# Patient Record
Sex: FEMALE | Race: WHITE | URBAN - METROPOLITAN AREA
[De-identification: names, ages, dates, MRNs, and addresses within clinical notes are randomized per-mention and may not be internally consistent; named-entity substitution may affect disease eponyms.]

---

## 2023-01-01 ENCOUNTER — NURSING HOME VISIT (OUTPATIENT)
Dept: POST ACUTE CARE | Facility: EXTERNAL LOCATION | Age: 88
End: 2023-01-01
Payer: MEDICARE

## 2023-01-01 DIAGNOSIS — I50.32 CHRONIC DIASTOLIC CONGESTIVE HEART FAILURE (MULTI): ICD-10-CM

## 2023-01-01 DIAGNOSIS — I10 HYPERTENSION, ESSENTIAL: ICD-10-CM

## 2023-01-01 DIAGNOSIS — G30.1 SEVERE LATE ONSET ALZHEIMER'S DEMENTIA WITHOUT BEHAVIORAL DISTURBANCE, PSYCHOTIC DISTURBANCE, MOOD DISTURBANCE, OR ANXIETY (MULTI): ICD-10-CM

## 2023-01-01 DIAGNOSIS — I50.9 CONGESTIVE HEART FAILURE, UNSPECIFIED HF CHRONICITY, UNSPECIFIED HEART FAILURE TYPE (MULTI): ICD-10-CM

## 2023-01-01 DIAGNOSIS — M25.511 ACUTE PAIN OF BOTH SHOULDERS: ICD-10-CM

## 2023-01-01 DIAGNOSIS — F02.C0 SEVERE LATE ONSET ALZHEIMER'S DEMENTIA WITHOUT BEHAVIORAL DISTURBANCE, PSYCHOTIC DISTURBANCE, MOOD DISTURBANCE, OR ANXIETY (MULTI): ICD-10-CM

## 2023-01-01 DIAGNOSIS — R40.0 DAYTIME SLEEPINESS: ICD-10-CM

## 2023-01-01 DIAGNOSIS — U07.1 COVID: Primary | ICD-10-CM

## 2023-01-01 DIAGNOSIS — F02.C4 SEVERE LATE ONSET ALZHEIMER'S DEMENTIA WITH ANXIETY (MULTI): Primary | ICD-10-CM

## 2023-01-01 DIAGNOSIS — R63.5 WEIGHT GAIN WITH EDEMA: ICD-10-CM

## 2023-01-01 DIAGNOSIS — R62.7 FTT (FAILURE TO THRIVE) IN ADULT: ICD-10-CM

## 2023-01-01 DIAGNOSIS — F02.C0 SEVERE LATE ONSET ALZHEIMER'S DEMENTIA WITHOUT BEHAVIORAL DISTURBANCE, PSYCHOTIC DISTURBANCE, MOOD DISTURBANCE, OR ANXIETY (MULTI): Primary | ICD-10-CM

## 2023-01-01 DIAGNOSIS — I50.33 ACUTE ON CHRONIC DIASTOLIC CONGESTIVE HEART FAILURE (MULTI): Primary | ICD-10-CM

## 2023-01-01 DIAGNOSIS — R63.0 POOR APPETITE: ICD-10-CM

## 2023-01-01 DIAGNOSIS — F02.C4 SEVERE LATE ONSET ALZHEIMER'S DEMENTIA WITH ANXIETY (MULTI): ICD-10-CM

## 2023-01-01 DIAGNOSIS — G30.1 SEVERE LATE ONSET ALZHEIMER'S DEMENTIA WITHOUT BEHAVIORAL DISTURBANCE, PSYCHOTIC DISTURBANCE, MOOD DISTURBANCE, OR ANXIETY (MULTI): Primary | ICD-10-CM

## 2023-01-01 DIAGNOSIS — R60.9 WEIGHT GAIN WITH EDEMA: ICD-10-CM

## 2023-01-01 DIAGNOSIS — R53.1 WEAKNESS: Primary | ICD-10-CM

## 2023-01-01 DIAGNOSIS — G30.1 SEVERE LATE ONSET ALZHEIMER'S DEMENTIA WITH ANXIETY (MULTI): Primary | ICD-10-CM

## 2023-01-01 DIAGNOSIS — U07.1 COVID: ICD-10-CM

## 2023-01-01 DIAGNOSIS — R60.9 EDEMA, UNSPECIFIED TYPE: Primary | ICD-10-CM

## 2023-01-01 DIAGNOSIS — R79.1 SUBTHERAPEUTIC INTERNATIONAL NORMALIZED RATIO (INR): ICD-10-CM

## 2023-01-01 DIAGNOSIS — R53.1 WEAKNESS: ICD-10-CM

## 2023-01-01 DIAGNOSIS — I11.0 HYPERTENSIVE HEART DISEASE WITH CHRONIC DIASTOLIC CONGESTIVE HEART FAILURE (MULTI): ICD-10-CM

## 2023-01-01 DIAGNOSIS — I50.32 CHRONIC DIASTOLIC CONGESTIVE HEART FAILURE (MULTI): Primary | ICD-10-CM

## 2023-01-01 DIAGNOSIS — F41.8 DEPRESSION WITH ANXIETY: ICD-10-CM

## 2023-01-01 DIAGNOSIS — F41.8 DEPRESSION WITH ANXIETY: Primary | ICD-10-CM

## 2023-01-01 DIAGNOSIS — R23.4 SCAB: ICD-10-CM

## 2023-01-01 DIAGNOSIS — I10 HYPERTENSION, UNSPECIFIED TYPE: ICD-10-CM

## 2023-01-01 DIAGNOSIS — F32.A DEPRESSION, UNSPECIFIED DEPRESSION TYPE: ICD-10-CM

## 2023-01-01 DIAGNOSIS — R63.5 WEIGHT GAIN WITH EDEMA: Primary | ICD-10-CM

## 2023-01-01 DIAGNOSIS — R20.9 COLD FEET: Primary | ICD-10-CM

## 2023-01-01 DIAGNOSIS — R60.9 WEIGHT GAIN WITH EDEMA: Primary | ICD-10-CM

## 2023-01-01 DIAGNOSIS — L98.9 SKIN ABNORMALITY: Primary | ICD-10-CM

## 2023-01-01 DIAGNOSIS — R63.5 WEIGHT GAIN: ICD-10-CM

## 2023-01-01 DIAGNOSIS — G30.1 SEVERE LATE ONSET ALZHEIMER'S DEMENTIA WITH ANXIETY (MULTI): ICD-10-CM

## 2023-01-01 DIAGNOSIS — I50.32 HYPERTENSIVE HEART DISEASE WITH CHRONIC DIASTOLIC CONGESTIVE HEART FAILURE (MULTI): ICD-10-CM

## 2023-01-01 DIAGNOSIS — M25.512 ACUTE PAIN OF BOTH SHOULDERS: ICD-10-CM

## 2023-01-01 DIAGNOSIS — I11.0 HYPERTENSIVE HEART DISEASE WITH CHRONIC DIASTOLIC CONGESTIVE HEART FAILURE (MULTI): Primary | ICD-10-CM

## 2023-01-01 DIAGNOSIS — I50.33 ACUTE ON CHRONIC DIASTOLIC CONGESTIVE HEART FAILURE (MULTI): ICD-10-CM

## 2023-01-01 DIAGNOSIS — I50.32 HYPERTENSIVE HEART DISEASE WITH CHRONIC DIASTOLIC CONGESTIVE HEART FAILURE (MULTI): Primary | ICD-10-CM

## 2023-01-01 DIAGNOSIS — I10 HYPERTENSION, ESSENTIAL: Primary | ICD-10-CM

## 2023-01-01 DIAGNOSIS — L85.3 DRY SKIN: ICD-10-CM

## 2023-01-01 DIAGNOSIS — F03.918 DEMENTIA WITH OTHER BEHAVIORAL DISTURBANCE, UNSPECIFIED DEMENTIA SEVERITY, UNSPECIFIED DEMENTIA TYPE (MULTI): ICD-10-CM

## 2023-01-01 PROCEDURE — 99308 SBSQ NF CARE LOW MDM 20: CPT | Performed by: INTERNAL MEDICINE

## 2023-01-01 PROCEDURE — 99309 SBSQ NF CARE MODERATE MDM 30: CPT | Performed by: INTERNAL MEDICINE

## 2023-01-01 PROCEDURE — 99308 SBSQ NF CARE LOW MDM 20: CPT | Performed by: NURSE PRACTITIONER

## 2023-01-01 PROCEDURE — 99307 SBSQ NF CARE SF MDM 10: CPT | Performed by: NURSE PRACTITIONER

## 2023-01-01 PROCEDURE — 99309 SBSQ NF CARE MODERATE MDM 30: CPT | Performed by: NURSE PRACTITIONER

## 2023-03-08 NOTE — LETTER
Subjective  Chief complaint: Juani Sinclair is a 88 y.o. female who is a long term resident patient being seen and evaluated for multiple medical problems.  Patient presents for Thank you general medical care and follow-up    HPI:  patient presents for general medical care and f/u.  Patient seen and examined at bedside.  No issues per nursing.  Patient has no acute complaints.  Patient with diagnosis of depression.   Denies feeling down and thoughts of harming self or others.   Denies chest pain and headache.  denies sob, orthopnea, weight gain.  No acute distress        Review of Systems  All systems reviewed and negative except for what was mentioned in the HPI    Vital signs:     Objective  Physical Exam  Constitutional:       General: She is not in acute distress.  Eyes:      Extraocular Movements: Extraocular movements intact.   Cardiovascular:      Rate and Rhythm: Regular rhythm.   Pulmonary:      Effort: Pulmonary effort is normal.      Breath sounds: Normal breath sounds.   Abdominal:      General: Bowel sounds are normal.      Palpations: Abdomen is soft.   Musculoskeletal:      Cervical back: Neck supple.      Right lower leg: No edema.      Left lower leg: No edema.   Neurological:      Mental Status: She is alert.   Psychiatric:         Mood and Affect: Mood normal.         Behavior: Behavior is cooperative.         Assessment/Plan  Problem List Items Addressed This Visit          Circulatory    HTN (hypertension)     At goal  Continue antihypertensives         CHF (congestive heart failure) (CMS/[facility])     Stab;e  Continue Diuretics            Other    Depression     Mood stable  Escitalopram          Medications, treatments, and labs reviewed  Continue medications and treatments as listed in [facility]    Scribe Attestation  By signing my name below, Rocio SMALLS, Scribe   attest that this documentation has been prepared under the direction and in the presence of Hong Hope MD.    Provider  Attestation - Scribe documentation  All medical record entries made by the Scribe were at my direction and personally dictated by me. I have reviewed the chart and agree that the record accurately reflects my personal performance of the history, physical exam, discussion and plan.

## 2023-03-09 PROBLEM — I50.9 CHF (CONGESTIVE HEART FAILURE) (MULTI): Status: ACTIVE | Noted: 2023-01-01

## 2023-03-09 PROBLEM — F32.A DEPRESSION: Status: ACTIVE | Noted: 2023-01-01

## 2023-03-09 PROBLEM — I10 HTN (HYPERTENSION): Status: ACTIVE | Noted: 2023-01-01

## 2023-03-10 NOTE — PROGRESS NOTES
Subjective   Chief complaint: Juani Sinclair is a 88 y.o. female who is a long term resident patient being seen and evaluated for multiple medical problems.  Patient presents for Thank you general medical care and follow-up    HPI:  patient presents for general medical care and f/u.  Patient seen and examined at bedside.  No issues per nursing.  Patient has no acute complaints.  Patient with diagnosis of depression.   Denies feeling down and thoughts of harming self or others.   Denies chest pain and headache.  denies sob, orthopnea, weight gain.  No acute distress        Review of Systems  All systems reviewed and negative except for what was mentioned in the HPI    Vital signs:     Objective   Physical Exam  Constitutional:       General: She is not in acute distress.  Eyes:      Extraocular Movements: Extraocular movements intact.   Cardiovascular:      Rate and Rhythm: Regular rhythm.   Pulmonary:      Effort: Pulmonary effort is normal.      Breath sounds: Normal breath sounds.   Abdominal:      General: Bowel sounds are normal.      Palpations: Abdomen is soft.   Musculoskeletal:      Cervical back: Neck supple.      Right lower leg: No edema.      Left lower leg: No edema.   Neurological:      Mental Status: She is alert.   Psychiatric:         Mood and Affect: Mood normal.         Behavior: Behavior is cooperative.         Assessment/Plan   Problem List Items Addressed This Visit          Circulatory    HTN (hypertension)     At goal  Continue antihypertensives         CHF (congestive heart failure) (CMS/[facility])     Stab;e  Continue Diuretics            Other    Depression     Mood stable  Escitalopram          Medications, treatments, and labs reviewed  Continue medications and treatments as listed in P[facility]    Scribe Attestation  By signing my name below, Rocio SMALLS, Scribe   attest that this documentation has been prepared under the direction and in the presence of Hong Hope MD.    Provider  Attestation - Scribe documentation  All medical record entries made by the Scribe were at my direction and personally dictated by me. I have reviewed the chart and agree that the record accurately reflects my personal performance of the history, physical exam, discussion and plan.

## 2023-04-12 NOTE — LETTER
Patient: Juani Sinclair  : 1934    Encounter Date: 2023    PROGRESS NOTE    Subjective  Chief complaint: Juani Sinclair is a 88 y.o. female who is a long term care patient being seen and evaluated for monthly general medical care and follow-up.    HPI:   patient presents for general medical care and f/u.  Patient seen and examined at bedside.  No issues per nursing.  Patient has no acute complaints.   denies sob, orthopnea, weight gain.   Denies chest pain and headache.    Patient with diagnosis of depression.  Denies feeling down and thoughts of harming self or others.  No acute distress.  Patient has dementia and requires assist with ADLs.        Objective  Vital signs:  108/62, 88, 18, 93%    Physical Exam  Constitutional:       General: She is not in acute distress.  Eyes:      Extraocular Movements: Extraocular movements intact.   Cardiovascular:      Rate and Rhythm: Normal rate and regular rhythm.   Pulmonary:      Effort: Pulmonary effort is normal.      Breath sounds: Normal breath sounds.   Abdominal:      General: Bowel sounds are normal.      Palpations: Abdomen is soft.   Musculoskeletal:         General: Normal range of motion.      Cervical back: Normal range of motion and neck supple.      Right lower leg: No edema.      Left lower leg: No edema.   Neurological:      Mental Status: She is alert.   Psychiatric:         Mood and Affect: Mood normal.         Behavior: Behavior is cooperative.         Assessment/Plan  Problem List Items Addressed This Visit          Nervous    Dementia (CMS/[facility])     Secured unit for safety            Circulatory    HTN (hypertension)     At goal  Continue antihypertensives         CHF (congestive heart failure) (CMS/[facility])     Stable  Continue Diuretics            Other    Depression     Mood stable  Escitalopram          Medications, treatments, and labs reviewed  Continue medications and treatments as listed in [facility]    Scribe Attestation  By signing my  name below, I, Shantanu Morenoibe   attest that this documentation has been prepared under the direction and in the presence of Hong Hope MD.    Provider Attestation - Scribe documentation  All medical record entries made by the Scribe were at my direction and personally dictated by me. I have reviewed the chart and agree that the record accurately reflects my personal performance of the history, physical exam, discussion and plan.      Electronically Signed By: Hong Hope MD   4/14/23  5:54 PM

## 2023-04-14 PROBLEM — F03.90 DEMENTIA (MULTI): Status: ACTIVE | Noted: 2023-01-01

## 2023-04-14 NOTE — PROGRESS NOTES
PROGRESS NOTE    Subjective   Chief complaint: Juani Sinclair is a 88 y.o. female who is a long term care patient being seen and evaluated for monthly general medical care and follow-up.    HPI:   patient presents for general medical care and f/u.  Patient seen and examined at bedside.  No issues per nursing.  Patient has no acute complaints.   denies sob, orthopnea, weight gain.   Denies chest pain and headache.    Patient with diagnosis of depression.  Denies feeling down and thoughts of harming self or others.  No acute distress.  Patient has dementia and requires assist with ADLs.        Objective   Vital signs:  108/62, 88, 18, 93%    Physical Exam  Constitutional:       General: She is not in acute distress.  Eyes:      Extraocular Movements: Extraocular movements intact.   Cardiovascular:      Rate and Rhythm: Normal rate and regular rhythm.   Pulmonary:      Effort: Pulmonary effort is normal.      Breath sounds: Normal breath sounds.   Abdominal:      General: Bowel sounds are normal.      Palpations: Abdomen is soft.   Musculoskeletal:         General: Normal range of motion.      Cervical back: Normal range of motion and neck supple.      Right lower leg: No edema.      Left lower leg: No edema.   Neurological:      Mental Status: She is alert.   Psychiatric:         Mood and Affect: Mood normal.         Behavior: Behavior is cooperative.         Assessment/Plan   Problem List Items Addressed This Visit          Nervous    Dementia (CMS/[facility])     Secured unit for safety            Circulatory    HTN (hypertension)     At goal  Continue antihypertensives         CHF (congestive heart failure) (CMS/[facility])     Stable  Continue Diuretics            Other    Depression     Mood stable  Escitalopram          Medications, treatments, and labs reviewed  Continue medications and treatments as listed in [facility]    Scribe Attestation  By signing my name below, IRocio, Scribe   attest that this documentation  has been prepared under the direction and in the presence of Hong Hope MD.    Provider Attestation - Scribe documentation  All medical record entries made by the Scribe were at my direction and personally dictated by me. I have reviewed the chart and agree that the record accurately reflects my personal performance of the history, physical exam, discussion and plan.

## 2023-04-19 NOTE — LETTER
Patient: Juani Sinclair  : 1934    Encounter Date: 2023    PROGRESS NOTE    Subjective  Chief complaint: Juani Sinclair is a 88 y.o. female who is a long term care patient being seen and evaluated for  weakness    HPI:   patient with a weakness is being evaluated for possible treatment with therapy services.  She has no new issues or concerns at this time.  No acute distress.   Denies shortness of breath orthopnea.      Objective  Vital signs:  126/74, 94%    Physical Exam  Constitutional:       General: She is not in acute distress.  Eyes:      Extraocular Movements: Extraocular movements intact.   Cardiovascular:      Rate and Rhythm: Normal rate and regular rhythm.   Pulmonary:      Effort: Pulmonary effort is normal.      Breath sounds: Normal breath sounds.   Abdominal:      General: Bowel sounds are normal.      Palpations: Abdomen is soft.   Musculoskeletal:         General: Normal range of motion.      Cervical back: Normal range of motion and neck supple.      Right lower leg: No edema.      Left lower leg: No edema.   Neurological:      Mental Status: She is alert.   Psychiatric:         Mood and Affect: Mood normal.         Behavior: Behavior is cooperative.         Assessment/Plan  Problem List Items Addressed This Visit          Circulatory    CHF (congestive heart failure) (CMS/HCC)     Stable  Continue Diuretics            Other    Weakness      therapy to eval and treat.          Medications, treatments, and labs reviewed  Continue medications and treatments as listed in [facility]    Scribe Attestation  By signing my name below, I, Mihir Moreno   attest that this documentation has been prepared under the direction and in the presence of Hong Hope MD.    Provider Attestation - Scribe documentation  All medical record entries made by the Scribe were at my direction and personally dictated by me. I have reviewed the chart and agree that the record accurately reflects my  personal performance of the history, physical exam, discussion and plan.    1. Congestive heart failure, unspecified HF chronicity, unspecified heart failure type (CMS/HCC)        2. Weakness               Electronically Signed By: Hong Hope MD   4/20/23  6:25 PM

## 2023-04-20 PROBLEM — R53.1 WEAKNESS: Status: ACTIVE | Noted: 2023-01-01

## 2023-04-20 NOTE — PROGRESS NOTES
PROGRESS NOTE    Subjective   Chief complaint: Juani Sinclair is a 88 y.o. female who is a long term care patient being seen and evaluated for  weakness    HPI:   patient with a weakness is being evaluated for possible treatment with therapy services.  She has no new issues or concerns at this time.  No acute distress.   Denies shortness of breath orthopnea.      Objective   Vital signs:  126/74, 94%    Physical Exam  Constitutional:       General: She is not in acute distress.  Eyes:      Extraocular Movements: Extraocular movements intact.   Cardiovascular:      Rate and Rhythm: Normal rate and regular rhythm.   Pulmonary:      Effort: Pulmonary effort is normal.      Breath sounds: Normal breath sounds.   Abdominal:      General: Bowel sounds are normal.      Palpations: Abdomen is soft.   Musculoskeletal:         General: Normal range of motion.      Cervical back: Normal range of motion and neck supple.      Right lower leg: No edema.      Left lower leg: No edema.   Neurological:      Mental Status: She is alert.   Psychiatric:         Mood and Affect: Mood normal.         Behavior: Behavior is cooperative.         Assessment/Plan   Problem List Items Addressed This Visit          Circulatory    CHF (congestive heart failure) (CMS/HCC)     Stable  Continue Diuretics            Other    Weakness      therapy to eval and treat.          Medications, treatments, and labs reviewed  Continue medications and treatments as listed in [facility]    Scribe Attestation  By signing my name below, I, Mihir Moreno   attest that this documentation has been prepared under the direction and in the presence of Hong Hope MD.    Provider Attestation - Scribe documentation  All medical record entries made by the Scribe were at my direction and personally dictated by me. I have reviewed the chart and agree that the record accurately reflects my personal performance of the history, physical exam, discussion and  plan.    1. Congestive heart failure, unspecified HF chronicity, unspecified heart failure type (CMS/[facility])        2. Weakness

## 2023-05-09 NOTE — LETTER
Patient: Juani Sinclair  : 1934    Encounter Date: 2023    PROGRESS NOTE    Subjective  Chief complaint: Juani Sinclair is a 88 y.o. female who is a long term care patient being seen and evaluated for oli on skin    HPI:  Nurse reporting patient noted to have oli on skin on b/l lateral thighs noted on .  Patient denies thigh pain.       Objective  Vital signs: 151/78    Physical Exam  Constitutional:       General: She is not in acute distress.  Eyes:      Extraocular Movements: Extraocular movements intact.   Pulmonary:      Effort: Pulmonary effort is normal.   Musculoskeletal:      Cervical back: Neck supple.      Comments: Generalized weakness   Skin:     Comments: B/l thigh with no swelling, redness, discoloration, or wounds noted   Neurological:      Mental Status: She is alert.   Psychiatric:         Mood and Affect: Mood normal.         Behavior: Behavior is cooperative.         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

## 2023-05-09 NOTE — PROGRESS NOTES
PROGRESS NOTE    Subjective   Chief complaint: Juani Sinclair is a 88 y.o. female who is a long term care patient being seen and evaluated for oli on skin    HPI:  Nurse reporting patient noted to have oli on skin on b/l lateral thighs noted on 5/7.  Patient denies thigh pain.       Objective   Vital signs: 151/78    Physical Exam  Constitutional:       General: She is not in acute distress.  Eyes:      Extraocular Movements: Extraocular movements intact.   Pulmonary:      Effort: Pulmonary effort is normal.   Musculoskeletal:      Cervical back: Neck supple.      Comments: Generalized weakness   Skin:     Comments: B/l thigh with no swelling, redness, discoloration, or wounds noted   Neurological:      Mental Status: She is alert.   Psychiatric:         Mood and Affect: Mood normal.         Behavior: Behavior is cooperative.         Assessment/Plan   Problem List Items Addressed This Visit       Hypertension, essential     Continue antihypertensives  Monitor BP         Severe late onset Alzheimer's dementia without behavioral disturbance, psychotic disturbance, mood disturbance, or anxiety (CMS/[facility])     Secured unit for safety         Skin abnormality - Primary     No acute findings  Continue to monitor          Medications, treatments, and labs reviewed  Continue medications and treatments as listed in P[facility]    Mandy Small, NAVI-CNP

## 2023-05-10 PROBLEM — F02.C0 SEVERE LATE ONSET ALZHEIMER'S DEMENTIA WITHOUT BEHAVIORAL DISTURBANCE, PSYCHOTIC DISTURBANCE, MOOD DISTURBANCE, OR ANXIETY (MULTI): Status: ACTIVE | Noted: 2023-01-01

## 2023-05-10 PROBLEM — G30.1 SEVERE LATE ONSET ALZHEIMER'S DEMENTIA WITHOUT BEHAVIORAL DISTURBANCE, PSYCHOTIC DISTURBANCE, MOOD DISTURBANCE, OR ANXIETY (MULTI): Status: ACTIVE | Noted: 2023-01-01

## 2023-05-10 PROBLEM — L98.9 SKIN ABNORMALITY: Status: ACTIVE | Noted: 2023-01-01

## 2023-05-10 NOTE — LETTER
Patient: Juani Sinclair  : 1934    Encounter Date: 05/10/2023    PROGRESS NOTE    Subjective  Chief complaint: Juani Sinclair is a 88 y.o. female who is a long term care patient being seen and evaluated for monthly general medical care and follow-up.    HPI:   patient presents for general medical care and f/u.  Patient seen and examined at bedside.  No issues per nursing.  Patient has no acute complaints.   denies sob, orthopnea, weight gain.   Denies chest pain and headache.    Patient with diagnosis of depression.  Denies feeling down and thoughts of harming self or others.  No acute distress.  Patient has dementia and requires assist with ADLs.        Objective  Vital signs:  111/64,  94%    Physical Exam  Constitutional:       General: She is not in acute distress.  Eyes:      Extraocular Movements: Extraocular movements intact.   Cardiovascular:      Rate and Rhythm: Normal rate and regular rhythm.   Pulmonary:      Effort: Pulmonary effort is normal.      Breath sounds: Normal breath sounds.   Abdominal:      General: Bowel sounds are normal.      Palpations: Abdomen is soft.   Musculoskeletal:         General: Normal range of motion.      Cervical back: Normal range of motion and neck supple.      Right lower leg: No edema.      Left lower leg: No edema.   Neurological:      Mental Status: She is alert.   Psychiatric:         Mood and Affect: Mood normal.         Behavior: Behavior is cooperative.         Assessment/Plan  Problem List Items Addressed This Visit          Circulatory    Hypertension, essential     Continue antihypertensives  Monitor BP         CHF (congestive heart failure) (CMS/HCC)     Stable  Continue Diuretics            Other    Depression with anxiety     Mood stable  Monitor for changes in mod and behavior          Medications, treatments, and labs reviewed  Continue medications and treatments as listed in [facility]    Scribe Attestation  By signing my name below, I, Rocio Ellis,  Scribe   attest that this documentation has been prepared under the direction and in the presence of Hong Hope MD.    Provider Attestation - Scribe documentation  All medical record entries made by the Scribe were at my direction and personally dictated by me. I have reviewed the chart and agree that the record accurately reflects my personal performance of the history, physical exam, discussion and plan.  1. Congestive heart failure, unspecified HF chronicity, unspecified heart failure type (CMS/HCC)        2. Depression with anxiety        3. Hypertension, essential              Electronically Signed By: Hong Hope MD   5/14/23 12:33 PM

## 2023-05-11 PROBLEM — F41.8 DEPRESSION WITH ANXIETY: Status: ACTIVE | Noted: 2023-01-01

## 2023-05-11 NOTE — LETTER
Patient: Juani Sinclair  : 1934    Encounter Date: 2023    PROGRESS NOTE    Subjective  Chief complaint: Juani Sinclair is a 88 y.o. female who is a long term care patient being seen and evaluated for follow-up depression with anxiety    HPI:  I was asked to see patient to evaluate for continued use of Lexapro and Ativan.  Patient with diagnosis of depression with anxiety.  Mood has been stable on current medication regime according to the nurse.  Patient denies feeling down or anxious.      Objective  Vital signs: 151/78    Physical Exam  Constitutional:       General: She is not in acute distress.  Eyes:      Extraocular Movements: Extraocular movements intact.   Pulmonary:      Effort: Pulmonary effort is normal.   Musculoskeletal:      Cervical back: Neck supple.   Neurological:      Mental Status: She is alert.   Psychiatric:         Mood and Affect: Mood normal.         Behavior: Behavior is cooperative.         Assessment/Plan  Problem List Items Addressed This Visit       Depression with anxiety - Primary     Gradual dose reduction clinically contraindicated at this time.  Continued use is within current practice guidelines and medication reduction may cause return or worsening of symptoms.  Patient is stable and without side effects of therapy and these continue to be monitored.  Continue current medications as listed           Hypertension, essential     Continue antihypertensives  Monitor BP         Severe late onset Alzheimer's dementia without behavioral disturbance, psychotic disturbance, mood disturbance, or anxiety (CMS/[facility])     Secured unit for safety          Medications, treatments, and labs reviewed  Continue medications and treatments as listed in [facility]    NIKKO Anderson      Electronically Signed By: NIKKO Anderson   23  3:59 PM

## 2023-05-11 NOTE — ASSESSMENT & PLAN NOTE
Gradual dose reduction clinically contraindicated at this time.  Continued use is within current practice guidelines and medication reduction may cause return or worsening of symptoms.  Patient is stable and without side effects of therapy and these continue to be monitored.  Continue current medications as listed

## 2023-05-11 NOTE — PROGRESS NOTES
PROGRESS NOTE    Subjective   Chief complaint: Juani Sinclair is a 88 y.o. female who is a long term care patient being seen and evaluated for follow-up depression with anxiety    HPI:  I was asked to see patient to evaluate for continued use of Lexapro and Ativan.  Patient with diagnosis of depression with anxiety.  Mood has been stable on current medication regime according to the nurse.  Patient denies feeling down or anxious.      Objective   Vital signs: 151/78    Physical Exam  Constitutional:       General: She is not in acute distress.  Eyes:      Extraocular Movements: Extraocular movements intact.   Pulmonary:      Effort: Pulmonary effort is normal.   Musculoskeletal:      Cervical back: Neck supple.   Neurological:      Mental Status: She is alert.   Psychiatric:         Mood and Affect: Mood normal.         Behavior: Behavior is cooperative.         Assessment/Plan   Problem List Items Addressed This Visit       Depression with anxiety - Primary     Gradual dose reduction clinically contraindicated at this time.  Continued use is within current practice guidelines and medication reduction may cause return or worsening of symptoms.  Patient is stable and without side effects of therapy and these continue to be monitored.  Continue current medications as listed           Hypertension, essential     Continue antihypertensives  Monitor BP         Severe late onset Alzheimer's dementia without behavioral disturbance, psychotic disturbance, mood disturbance, or anxiety (CMS/[facility])     Secured unit for safety          Medications, treatments, and labs reviewed  Continue medications and treatments as listed in [facility]    Mandy Small, APRN-CNP

## 2023-05-14 NOTE — PROGRESS NOTES
PROGRESS NOTE    Subjective   Chief complaint: Juani Sinclair is a 88 y.o. female who is a long term care patient being seen and evaluated for monthly general medical care and follow-up.    HPI:   patient presents for general medical care and f/u.  Patient seen and examined at bedside.  No issues per nursing.  Patient has no acute complaints.   denies sob, orthopnea, weight gain.   Denies chest pain and headache.    Patient with diagnosis of depression.  Denies feeling down and thoughts of harming self or others.  No acute distress.  Patient has dementia and requires assist with ADLs.        Objective   Vital signs:  111/64,  94%    Physical Exam  Constitutional:       General: She is not in acute distress.  Eyes:      Extraocular Movements: Extraocular movements intact.   Cardiovascular:      Rate and Rhythm: Normal rate and regular rhythm.   Pulmonary:      Effort: Pulmonary effort is normal.      Breath sounds: Normal breath sounds.   Abdominal:      General: Bowel sounds are normal.      Palpations: Abdomen is soft.   Musculoskeletal:         General: Normal range of motion.      Cervical back: Normal range of motion and neck supple.      Right lower leg: No edema.      Left lower leg: No edema.   Neurological:      Mental Status: She is alert.   Psychiatric:         Mood and Affect: Mood normal.         Behavior: Behavior is cooperative.         Assessment/Plan   Problem List Items Addressed This Visit          Circulatory    Hypertension, essential     Continue antihypertensives  Monitor BP         CHF (congestive heart failure) (CMS/HCC)     Stable  Continue Diuretics            Other    Depression with anxiety     Mood stable  Monitor for changes in mod and behavior          Medications, treatments, and labs reviewed  Continue medications and treatments as listed in P[facility]    Scribe Attestation  By signing my name below, IRocio, Scrdaiana   attest that this documentation has been prepared under the  direction and in the presence of Hong Hope MD.    Provider Attestation - Scribe documentation  All medical record entries made by the Scribe were at my direction and personally dictated by me. I have reviewed the chart and agree that the record accurately reflects my personal performance of the history, physical exam, discussion and plan.  1. Congestive heart failure, unspecified HF chronicity, unspecified heart failure type (CMS/HCC)        2. Depression with anxiety        3. Hypertension, essential

## 2023-05-18 NOTE — PROGRESS NOTES
PROGRESS NOTE    Subjective   Chief complaint: Juani Sinclair is a 88 y.o. female who is a long term care patient being seen and evaluated for weight gain    HPI:  Nurse reporting patient with 20 lb wt gain in 4 months.  She states that patient has poor appetite.  Patient is on lasix 10mg every day and has chronic leg edema.  She denies sob.      Objective   Vital signs: 120/62, 18, 7.5, 74, 93%    Physical Exam  Constitutional:       General: She is not in acute distress.  Eyes:      Extraocular Movements: Extraocular movements intact.   Cardiovascular:      Rate and Rhythm: Normal rate and regular rhythm.   Pulmonary:      Effort: Pulmonary effort is normal.      Breath sounds: Decreased breath sounds present.   Musculoskeletal:      Cervical back: Neck supple.      Right lower leg: Edema present.      Left lower leg: Edema present.      Comments: Gen weakness   Neurological:      Mental Status: She is alert.   Psychiatric:         Mood and Affect: Mood normal.         Behavior: Behavior is cooperative.         Assessment/Plan   Problem List Items Addressed This Visit       Acute on chronic diastolic congestive heart failure (CMS/HCC) - Primary     Increase lasix to 20mg daily  Monitor weight  GARFIELD diet  BMP in 1 week         Hypertension, essential     BP at goal  Continue antihypertensives  Monitor BP         Severe late onset Alzheimer's dementia without behavioral disturbance, psychotic disturbance, mood disturbance, or anxiety (CMS/HCC)     Secured unit for safety         Weight gain     Likely 2/2 fluid  Increase Lasix   Obtain BMP CBC TSH  Monitor weight          Medications, treatments, and labs reviewed  Continue medications and treatments as listed in P[facility]    NAVI Anderson-CNP

## 2023-05-18 NOTE — LETTER
Patient: Juani Sinclair  : 1934    Encounter Date: 2023    PROGRESS NOTE    Subjective  Chief complaint: Juani Sinclair is a 88 y.o. female who is a long term care patient being seen and evaluated for weight gain    HPI:  Nurse reporting patient with 20 lb wt gain in 4 months.  She states that patient has poor appetite.  Patient is on lasix 10mg every day and has chronic leg edema.  She denies sob.      Objective  Vital signs: 120/62, 18, 7.5, 74, 93%    Physical Exam  Constitutional:       General: She is not in acute distress.  Eyes:      Extraocular Movements: Extraocular movements intact.   Cardiovascular:      Rate and Rhythm: Normal rate and regular rhythm.   Pulmonary:      Effort: Pulmonary effort is normal.      Breath sounds: Decreased breath sounds present.   Musculoskeletal:      Cervical back: Neck supple.      Right lower leg: Edema present.      Left lower leg: Edema present.      Comments: Gen weakness   Neurological:      Mental Status: She is alert.   Psychiatric:         Mood and Affect: Mood normal.         Behavior: Behavior is cooperative.         Assessment/Plan  Problem List Items Addressed This Visit       Acute on chronic diastolic congestive heart failure (CMS/HCC) - Primary     Increase lasix to 20mg daily  Monitor weight  GARFIELD diet  BMP in 1 week         Hypertension, essential     BP at goal  Continue antihypertensives  Monitor BP         Severe late onset Alzheimer's dementia without behavioral disturbance, psychotic disturbance, mood disturbance, or anxiety (CMS/HCC)     Secured unit for safety         Weight gain     Likely 2/2 fluid  Increase Lasix   Obtain BMP CBC TSH  Monitor weight          Medications, treatments, and labs reviewed  Continue medications and treatments as listed in P[facility]    NIKKO Anderson      Electronically Signed By: NIKKO Anderson   23  3:48 PM

## 2023-05-19 NOTE — LETTER
Patient: Juani Sinclair  : 1934    Encounter Date: 2023    PROGRESS NOTE    Subjective  Chief complaint: Juani Sinclair is a 88 y.o. female who is a long term care patient being seen and evaluated for weight gain    HPI:  Patient noted to have weight gain. She has also had poor appetite. Denies SOB. No acute distress at this time. No acute distress.       Objective  Vital signs: 143/76, 98%    Physical Exam  Constitutional:       General: She is not in acute distress.  Eyes:      Extraocular Movements: Extraocular movements intact.   Cardiovascular:      Rate and Rhythm: Normal rate and regular rhythm.   Pulmonary:      Effort: Pulmonary effort is normal.      Breath sounds: Normal breath sounds.   Abdominal:      General: Bowel sounds are normal.      Palpations: Abdomen is soft.   Musculoskeletal:      Cervical back: Neck supple.      Right lower leg: Edema present.      Left lower leg: Edema present.   Neurological:      Mental Status: She is alert.   Psychiatric:         Mood and Affect: Mood normal.         Behavior: Behavior is cooperative.         Assessment/Plan  Problem List Items Addressed This Visit          Circulatory    CHF (congestive heart failure) (CMS/[facility])     Weight gain  Continue Diuretics  Continue to monitor weight and SOB            Medications, treatments, and labs reviewed  Continue medications and treatments as listed in [facility]    Scribe Attestation  By signing my name below, I, Mihir Moreno   attest that this documentation has been prepared under the direction and in the presence of Hong Hope MD.    Provider Attestation - Scribe documentation  All medical record entries made by the Scribe were at my direction and personally dictated by me. I have reviewed the chart and agree that the record accurately reflects my personal performance of the history, physical exam, discussion and plan.    1. Congestive heart failure, unspecified HF chronicity, unspecified heart  failure type (CMS/HCC)               Electronically Signed By: Hong Hope MD   5/22/23  2:18 PM

## 2023-05-22 NOTE — PROGRESS NOTES
PROGRESS NOTE    Subjective   Chief complaint: Juani Sinclair is a 88 y.o. female who is a long term care patient being seen and evaluated for weight gain    HPI:  Patient noted to have weight gain. She has also had poor appetite. Denies SOB. No acute distress at this time. No acute distress.       Objective   Vital signs: 143/76, 98%    Physical Exam  Constitutional:       General: She is not in acute distress.  Eyes:      Extraocular Movements: Extraocular movements intact.   Cardiovascular:      Rate and Rhythm: Normal rate and regular rhythm.   Pulmonary:      Effort: Pulmonary effort is normal.      Breath sounds: Normal breath sounds.   Abdominal:      General: Bowel sounds are normal.      Palpations: Abdomen is soft.   Musculoskeletal:      Cervical back: Neck supple.      Right lower leg: Edema present.      Left lower leg: Edema present.   Neurological:      Mental Status: She is alert.   Psychiatric:         Mood and Affect: Mood normal.         Behavior: Behavior is cooperative.         Assessment/Plan   Problem List Items Addressed This Visit          Circulatory    CHF (congestive heart failure) (CMS/HCC)     Weight gain  Continue Diuretics  Continue to monitor weight and SOB            Medications, treatments, and labs reviewed  Continue medications and treatments as listed in P[facility]    Scribe Attestation  By signing my name below, I, Mihir Moreno   attest that this documentation has been prepared under the direction and in the presence of Hong Hope MD.    Provider Attestation - Scribe documentation  All medical record entries made by the Scribe were at my direction and personally dictated by me. I have reviewed the chart and agree that the record accurately reflects my personal performance of the history, physical exam, discussion and plan.    1. Congestive heart failure, unspecified HF chronicity, unspecified heart failure type (CMS/HCC)

## 2023-05-23 PROBLEM — R63.5 WEIGHT GAIN: Status: ACTIVE | Noted: 2023-01-01

## 2023-05-23 PROBLEM — I50.33 ACUTE ON CHRONIC DIASTOLIC CONGESTIVE HEART FAILURE (MULTI): Status: ACTIVE | Noted: 2023-01-01

## 2023-05-24 PROBLEM — R60.9 WEIGHT GAIN WITH EDEMA: Status: ACTIVE | Noted: 2023-01-01

## 2023-05-24 NOTE — LETTER
Patient: Juani Sinclair  : 1934    Encounter Date: 2023    PROGRESS NOTE    Subjective  Chief complaint: Juani Sinclair is a 88 y.o. female who is a long term care patient being seen and evaluated for weight gain and poor appetite    HPI:  Patient had weight gain and also has poor appetite. Lasix was increased to mg daily. Labs were obtained and pending. No other concerns at this time. No acute distress.       Objective  Vital signs: 123/76, 97%    Physical Exam  Constitutional:       General: She is not in acute distress.  Eyes:      Extraocular Movements: Extraocular movements intact.   Cardiovascular:      Rate and Rhythm: Normal rate and regular rhythm.   Pulmonary:      Effort: Pulmonary effort is normal.      Breath sounds: Normal breath sounds.   Abdominal:      General: Bowel sounds are normal.      Palpations: Abdomen is soft.   Musculoskeletal:      Cervical back: Neck supple.      Right lower leg: Edema present.      Left lower leg: Edema present.   Skin:     Comments: No redness or discoloration noted   Neurological:      Mental Status: She is alert.   Psychiatric:         Mood and Affect: Mood normal.         Behavior: Behavior is cooperative.         Assessment/Plan  Problem List Items Addressed This Visit    None    Medications, treatments, and labs reviewed  Continue medications and treatments as listed in [facility]    Scribe Attestation  By signing my name below, IRocio Scribe   attest that this documentation has been prepared under the direction and in the presence of Hong Hope MD.    Provider Attestation - Scribe documentation  All medical record entries made by the Scribe were at my direction and personally dictated by me. I have reviewed the chart and agree that the record accurately reflects my personal performance of the history, physical exam, discussion and plan.    No diagnosis found.       Electronically Signed By: Hong Hope MD   23  2:35 PM

## 2023-05-26 NOTE — LETTER
Patient: Juani Sinclair  : 1934    Encounter Date: 2023    PROGRESS NOTE    Subjective  Chief complaint: Juani Sinclair is a 88 y.o. female who is a long term care patient being seen and evaluated for edema    HPI:  Patient had edema, lasix was increased and edema is improved. Denies constitutional symptoms. No acute distress.      Objective  Vital signs: 124/67, 96%    Physical Exam  Constitutional:       General: She is not in acute distress.  Eyes:      Extraocular Movements: Extraocular movements intact.   Cardiovascular:      Rate and Rhythm: Normal rate and regular rhythm.   Pulmonary:      Effort: Pulmonary effort is normal.      Breath sounds: Normal breath sounds.   Abdominal:      General: Bowel sounds are normal.      Palpations: Abdomen is soft.   Musculoskeletal:      Cervical back: Neck supple.      Right lower leg: Edema present.      Left lower leg: Edema present.   Neurological:      Mental Status: She is alert.   Psychiatric:         Mood and Affect: Mood normal.         Behavior: Behavior is cooperative.         Assessment/Plan  Problem List Items Addressed This Visit          Endocrine/Metabolic    Weight gain with edema     Likely 2/2 fluid  Increased Lasix   Pending labs - BMP CBC TSH  Monitor weight          Medications, treatments, and labs reviewed  Continue medications and treatments as listed in [facility]    Scribe Attestation  By signing my name below, I, Rocio Ellis Scribwander   attest that this documentation has been prepared under the direction and in the presence of Hong Hope MD.    Provider Attestation - Scribe documentation  All medical record entries made by the Scribe were at my direction and personally dictated by me. I have reviewed the chart and agree that the record accurately reflects my personal performance of the history, physical exam, discussion and plan.    1. Weight gain with edema               Electronically Signed By: Hong Hope MD   23   7:35 PM

## 2023-05-30 NOTE — PROGRESS NOTES
PROGRESS NOTE    Subjective   Chief complaint: Juani Sinclair is a 88 y.o. female who is a long term care patient being seen and evaluated for edema    HPI:  Patient had edema, lasix was increased and edema is improved. Denies constitutional symptoms. No acute distress.      Objective   Vital signs: 124/67, 96%    Physical Exam  Constitutional:       General: She is not in acute distress.  Eyes:      Extraocular Movements: Extraocular movements intact.   Cardiovascular:      Rate and Rhythm: Normal rate and regular rhythm.   Pulmonary:      Effort: Pulmonary effort is normal.      Breath sounds: Normal breath sounds.   Abdominal:      General: Bowel sounds are normal.      Palpations: Abdomen is soft.   Musculoskeletal:      Cervical back: Neck supple.      Right lower leg: Edema present.      Left lower leg: Edema present.   Neurological:      Mental Status: She is alert.   Psychiatric:         Mood and Affect: Mood normal.         Behavior: Behavior is cooperative.         Assessment/Plan   Problem List Items Addressed This Visit          Endocrine/Metabolic    Weight gain with edema     Likely 2/2 fluid  Increased Lasix   Pending labs - BMP CBC TSH  Monitor weight          Medications, treatments, and labs reviewed  Continue medications and treatments as listed in [facility]    Scribe Attestation  By signing my name below, IRocio Scribe   attest that this documentation has been prepared under the direction and in the presence of Hong Hope MD.    Provider Attestation - Scribe documentation  All medical record entries made by the Scribe were at my direction and personally dictated by me. I have reviewed the chart and agree that the record accurately reflects my personal performance of the history, physical exam, discussion and plan.    1. Weight gain with edema

## 2023-06-09 NOTE — LETTER
Patient: Juani Sinclair  : 1934    Encounter Date: 2023    PROGRESS NOTE    Subjective  Chief complaint: Juani Sinclair is a 88 y.o. female who is a long term care patient being seen and evaluated for monthly general medical care and follow-up.    HPI:   patient presents for general medical care and f/u.  Patient seen and examined at bedside.  No issues per nursing.  Patient has no acute complaints.   denies sob, orthopnea, weight gain.  Patient with diagnosis of depression.  Denies feeling down and thoughts of harming self or others.  No acute distress.  Patient has dementia and requires assist with ADLs.  No acute distress.       Objective  Vital signs:  118/66,  94%    Physical Exam  Constitutional:       General: She is not in acute distress.  Eyes:      Extraocular Movements: Extraocular movements intact.   Cardiovascular:      Rate and Rhythm: Normal rate and regular rhythm.   Pulmonary:      Effort: Pulmonary effort is normal.      Breath sounds: Normal breath sounds.   Abdominal:      General: Bowel sounds are normal.      Palpations: Abdomen is soft.   Musculoskeletal:         General: Normal range of motion.      Cervical back: Normal range of motion and neck supple.      Right lower leg: No edema.      Left lower leg: No edema.   Neurological:      Mental Status: She is alert.   Psychiatric:         Mood and Affect: Mood normal.         Behavior: Behavior is cooperative.         Assessment/Plan  Problem List Items Addressed This Visit          Nervous    Severe late onset Alzheimer's dementia without behavioral disturbance, psychotic disturbance, mood disturbance, or anxiety (CMS/HCC)     Secured unit for safety            Circulatory    Acute on chronic diastolic congestive heart failure (CMS/HCC)     Lasix   Monitor weight  GARFIELD diet              Other    Depression with anxiety     Mood stable  Monitor for changes in mod and behavior          Medications, treatments, and labs  reviewed  Continue medications and treatments as listed in [facility]    Scribe Attestation  By signing my name below, I, iMhir Moreno   attest that this documentation has been prepared under the direction and in the presence of Hong Hope MD.    Provider Attestation - Scribe documentation  All medical record entries made by the Scribe were at my direction and personally dictated by me. I have reviewed the chart and agree that the record accurately reflects my personal performance of the history, physical exam, discussion and plan.  1. Severe late onset Alzheimer's dementia without behavioral disturbance, psychotic disturbance, mood disturbance, or anxiety (CMS/HCC)        2. Depression with anxiety        3. Acute on chronic diastolic congestive heart failure (CMS/HCC)              Electronically Signed By: Hong Hope MD   6/12/23  4:50 PM

## 2023-06-12 NOTE — PROGRESS NOTES
PROGRESS NOTE    Subjective   Chief complaint: Juani Sinclair is a 88 y.o. female who is a long term care patient being seen and evaluated for monthly general medical care and follow-up.    HPI:   patient presents for general medical care and f/u.  Patient seen and examined at bedside.  No issues per nursing.  Patient has no acute complaints.   denies sob, orthopnea, weight gain.  Patient with diagnosis of depression.  Denies feeling down and thoughts of harming self or others.  No acute distress.  Patient has dementia and requires assist with ADLs.  No acute distress.       Objective   Vital signs:  118/66,  94%    Physical Exam  Constitutional:       General: She is not in acute distress.  Eyes:      Extraocular Movements: Extraocular movements intact.   Cardiovascular:      Rate and Rhythm: Normal rate and regular rhythm.   Pulmonary:      Effort: Pulmonary effort is normal.      Breath sounds: Normal breath sounds.   Abdominal:      General: Bowel sounds are normal.      Palpations: Abdomen is soft.   Musculoskeletal:         General: Normal range of motion.      Cervical back: Normal range of motion and neck supple.      Right lower leg: No edema.      Left lower leg: No edema.   Neurological:      Mental Status: She is alert.   Psychiatric:         Mood and Affect: Mood normal.         Behavior: Behavior is cooperative.         Assessment/Plan   Problem List Items Addressed This Visit          Nervous    Severe late onset Alzheimer's dementia without behavioral disturbance, psychotic disturbance, mood disturbance, or anxiety (CMS/[facility])     Secured unit for safety            Circulatory    Acute on chronic diastolic congestive heart failure (CMS/HCC)     Lasix   Monitor weight  GARFIELD diet              Other    Depression with anxiety     Mood stable  Monitor for changes in mod and behavior          Medications, treatments, and labs reviewed  Continue medications and treatments as listed in P[facility]    Scribe  Attestation  By signing my name below, I, Mihir Moreno   attest that this documentation has been prepared under the direction and in the presence of Hong Hope MD.    Provider Attestation - Scribe documentation  All medical record entries made by the Scribe were at my direction and personally dictated by me. I have reviewed the chart and agree that the record accurately reflects my personal performance of the history, physical exam, discussion and plan.  1. Severe late onset Alzheimer's dementia without behavioral disturbance, psychotic disturbance, mood disturbance, or anxiety (CMS/HCC)        2. Depression with anxiety        3. Acute on chronic diastolic congestive heart failure (CMS/HCC)

## 2023-07-07 NOTE — PROGRESS NOTES
PROGRESS NOTE    Subjective   Chief complaint: Juani Sinclair is a 88 y.o. female who is a long term care patient being seen and evaluated for weakness    HPI:    patient is working with therapy due to weakness.  Requires assistance with transfers ADLs and mobility.  No new issues or concerns.  No acute distress.      Objective   Vital signs: 123/74, 98%    Physical Exam  Constitutional:       General: She is not in acute distress.  Eyes:      Extraocular Movements: Extraocular movements intact.   Cardiovascular:      Rate and Rhythm: Normal rate and regular rhythm.   Pulmonary:      Effort: Pulmonary effort is normal.      Breath sounds: Normal breath sounds.   Abdominal:      General: Bowel sounds are normal.      Palpations: Abdomen is soft.   Musculoskeletal:      Cervical back: Neck supple.      Right lower leg: No edema.      Left lower leg: No edema.   Neurological:      Mental Status: She is alert.   Psychiatric:         Mood and Affect: Mood normal.         Behavior: Behavior is cooperative.         Assessment/Plan   Problem List Items Addressed This Visit          Cardiac and Vasculature    Hypertension, essential     BP at goal  Continue antihypertensives  Monitor BP            Neuro    Severe late onset Alzheimer's dementia without behavioral disturbance, psychotic disturbance, mood disturbance, or anxiety (CMS/[facility])     Secured unit for safety  Mentation at baseline          Medications, treatments, and labs reviewed  Continue medications and treatments as listed in [facility]    Scribe Attestation  By signing my name below, I, Mihir Moreno   attest that this documentation has been prepared under the direction and in the presence of Hong Hope MD.    Provider Attestation - Scribe documentation  All medical record entries made by the Scribe were at my direction and personally dictated by me. I have reviewed the chart and agree that the record accurately reflects my personal performance of the  history, physical exam, discussion and plan.    1. Hypertension, essential        2. Severe late onset Alzheimer's dementia without behavioral disturbance, psychotic disturbance, mood disturbance, or anxiety (CMS/HCC)

## 2023-07-07 NOTE — LETTER
Patient: Juani Sinclair  : 1934    Encounter Date: 2023      PROGRESS NOTE    Subjective  Chief complaint: Juani Sinclair is a 88 y.o. female who is a long term care patient being seen and evaluated for weakness    HPI:    patient is working with therapy due to weakness.  Requires assistance with transfers ADLs and mobility.  No new issues or concerns.  No acute distress.      Objective  Vital signs: 123/74, 98%    Physical Exam  Constitutional:       General: She is not in acute distress.  Eyes:      Extraocular Movements: Extraocular movements intact.   Cardiovascular:      Rate and Rhythm: Normal rate and regular rhythm.   Pulmonary:      Effort: Pulmonary effort is normal.      Breath sounds: Normal breath sounds.   Abdominal:      General: Bowel sounds are normal.      Palpations: Abdomen is soft.   Musculoskeletal:      Cervical back: Neck supple.      Right lower leg: No edema.      Left lower leg: No edema.   Neurological:      Mental Status: She is alert.   Psychiatric:         Mood and Affect: Mood normal.         Behavior: Behavior is cooperative.         Assessment/Plan  Problem List Items Addressed This Visit          Cardiac and Vasculature    Hypertension, essential     BP at goal  Continue antihypertensives  Monitor BP            Neuro    Severe late onset Alzheimer's dementia without behavioral disturbance, psychotic disturbance, mood disturbance, or anxiety (CMS/[facility])     Secured unit for safety  Mentation at baseline          Medications, treatments, and labs reviewed  Continue medications and treatments as listed in [facility]    Scribe Attestation  By signing my name below, IRocio Scribe   attest that this documentation has been prepared under the direction and in the presence of Hong Hope MD.    Provider Attestation - Scribe documentation  All medical record entries made by the Scribe were at my direction and personally dictated by me. I have reviewed the chart and  agree that the record accurately reflects my personal performance of the history, physical exam, discussion and plan.    1. Hypertension, essential        2. Severe late onset Alzheimer's dementia without behavioral disturbance, psychotic disturbance, mood disturbance, or anxiety (CMS/[facility])                Electronically Signed By: Hong Hope MD   7/7/23  5:18 PM

## 2023-07-12 NOTE — PROGRESS NOTES
PROGRESS NOTE    Subjective   Chief complaint: Juani Sinclair is a 88 y.o. female who is a long term care patient being seen and evaluated for monthly general medical care and follow-up.    HPI:   patient presents for general medical care and f/u.  Patient seen and examined at bedside.  No issues per nursing.  Patient has no acute complaints.   denies sob, orthopnea, weight gain.  Patient with diagnosis of depression.  Denies feeling down and thoughts of harming self or others.  No acute distress.  Patient has dementia and requires assist with ADLs.  No acute distress.       Objective   Vital signs:  120/66,  94%    Physical Exam  Constitutional:       General: She is not in acute distress.  Eyes:      Extraocular Movements: Extraocular movements intact.   Cardiovascular:      Rate and Rhythm: Normal rate and regular rhythm.   Pulmonary:      Effort: Pulmonary effort is normal.      Breath sounds: Normal breath sounds.   Abdominal:      General: Bowel sounds are normal.      Palpations: Abdomen is soft.   Musculoskeletal:         General: Normal range of motion.      Cervical back: Normal range of motion and neck supple.      Right lower leg: No edema.      Left lower leg: No edema.   Neurological:      Mental Status: She is alert.   Psychiatric:         Mood and Affect: Mood normal.         Behavior: Behavior is cooperative.         Assessment/Plan   Problem List Items Addressed This Visit          Cardiac and Vasculature    Acute on chronic diastolic congestive heart failure (CMS/HCC)     Lasix   Monitor weight  GARFIELD diet              Mental Health    Depression with anxiety     Mood stable  Monitor for changes in mod and behavior              Neuro    Severe late onset Alzheimer's dementia without behavioral disturbance, psychotic disturbance, mood disturbance, or anxiety (CMS/HCC)     Secured unit for safety  Mentation at baseline        Medications, treatments, and labs reviewed  Continue medications and  treatments as listed in [facility]    Scribe Attestation  By signing my name below, I, Mihir Moreno   attest that this documentation has been prepared under the direction and in the presence of Hong Hope MD.    Provider Attestation - Scribe documentation  All medical record entries made by the Scribe were at my direction and personally dictated by me. I have reviewed the chart and agree that the record accurately reflects my personal performance of the history, physical exam, discussion and plan.  1. Acute on chronic diastolic congestive heart failure (CMS/HCC)        2. Severe late onset Alzheimer's dementia without behavioral disturbance, psychotic disturbance, mood disturbance, or anxiety (CMS/HCC)        3. Depression with anxiety

## 2023-07-12 NOTE — LETTER
Patient: Juani Sinclair  : 1934    Encounter Date: 2023    PROGRESS NOTE    Subjective  Chief complaint: Juani Sinclair is a 88 y.o. female who is a long term care patient being seen and evaluated for monthly general medical care and follow-up.    HPI:   patient presents for general medical care and f/u.  Patient seen and examined at bedside.  No issues per nursing.  Patient has no acute complaints.   denies sob, orthopnea, weight gain.  Patient with diagnosis of depression.  Denies feeling down and thoughts of harming self or others.  No acute distress.  Patient has dementia and requires assist with ADLs.  No acute distress.       Objective  Vital signs:  120/66,  94%    Physical Exam  Constitutional:       General: She is not in acute distress.  Eyes:      Extraocular Movements: Extraocular movements intact.   Cardiovascular:      Rate and Rhythm: Normal rate and regular rhythm.   Pulmonary:      Effort: Pulmonary effort is normal.      Breath sounds: Normal breath sounds.   Abdominal:      General: Bowel sounds are normal.      Palpations: Abdomen is soft.   Musculoskeletal:         General: Normal range of motion.      Cervical back: Normal range of motion and neck supple.      Right lower leg: No edema.      Left lower leg: No edema.   Neurological:      Mental Status: She is alert.   Psychiatric:         Mood and Affect: Mood normal.         Behavior: Behavior is cooperative.         Assessment/Plan  Problem List Items Addressed This Visit          Cardiac and Vasculature    Acute on chronic diastolic congestive heart failure (CMS/HCC)     Lasix   Monitor weight  GARFIELD diet              Mental Health    Depression with anxiety     Mood stable  Monitor for changes in mod and behavior              Neuro    Severe late onset Alzheimer's dementia without behavioral disturbance, psychotic disturbance, mood disturbance, or anxiety (CMS/HCC)     Secured unit for safety  Mentation at baseline         Medications, treatments, and labs reviewed  Continue medications and treatments as listed in [facility]    Scribe Attestation  By signing my name below, I, Mihir Moreno   attest that this documentation has been prepared under the direction and in the presence of Hong Hope MD.    Provider Attestation - Scribe documentation  All medical record entries made by the Scribe were at my direction and personally dictated by me. I have reviewed the chart and agree that the record accurately reflects my personal performance of the history, physical exam, discussion and plan.  1. Acute on chronic diastolic congestive heart failure (CMS/HCC)        2. Severe late onset Alzheimer's dementia without behavioral disturbance, psychotic disturbance, mood disturbance, or anxiety (CMS/HCC)        3. Depression with anxiety              Electronically Signed By: Hong Hope MD   7/12/23  5:06 PM

## 2023-07-14 NOTE — PROGRESS NOTES
PROGRESS NOTE    Subjective   Chief complaint: Juani Sinclair is a 88 y.o. female who is a long term care patient being seen and evaluated for  weakness    HPI:   patient with dementia, mentation at baseline.   Patient is working in therapy due to weakness and debility.   Patient requires assistance for transfers ADLs and mobility.  No new issues or concerns.  No acute distress.      Objective   Vital signs:  121/76, 98%    Physical Exam  Constitutional:       General: She is not in acute distress.  Eyes:      Extraocular Movements: Extraocular movements intact.   Cardiovascular:      Rate and Rhythm: Normal rate and regular rhythm.   Pulmonary:      Effort: Pulmonary effort is normal.      Breath sounds: Normal breath sounds.   Abdominal:      General: Bowel sounds are normal.      Palpations: Abdomen is soft.   Musculoskeletal:      Cervical back: Neck supple.      Right lower leg: No edema.      Left lower leg: No edema.   Neurological:      Mental Status: She is alert.   Psychiatric:         Mood and Affect: Mood normal.         Behavior: Behavior is cooperative.         Assessment/Plan   Problem List Items Addressed This Visit          Neuro    Severe late onset Alzheimer's dementia without behavioral disturbance, psychotic disturbance, mood disturbance, or anxiety (CMS/[facility])     Secured unit for safety  Mentation at baseline    Monitor for changes in mentation or behaviors.            Symptoms and Signs    Weakness       Continue therapy          Medications, treatments, and labs reviewed  Continue medications and treatments as listed in [facility]    Scribe Attestation  By signing my name below, I, Mihir Moreno   attest that this documentation has been prepared under the direction and in the presence of Hong Hope MD.    Provider Attestation - Scribe documentation  All medical record entries made by the Scribe were at my direction and personally dictated by me. I have reviewed the chart and agree  that the record accurately reflects my personal performance of the history, physical exam, discussion and plan.    1. Severe late onset Alzheimer's dementia without behavioral disturbance, psychotic disturbance, mood disturbance, or anxiety (CMS/HCC)        2. Weakness

## 2023-07-14 NOTE — LETTER
Patient: Juani Sinclair  : 1934    Encounter Date: 2023    PROGRESS NOTE    Subjective  Chief complaint: Juani Sinclair is a 88 y.o. female who is a long term care patient being seen and evaluated for  weakness    HPI:   patient with dementia, mentation at baseline.   Patient is working in therapy due to weakness and debility.   Patient requires assistance for transfers ADLs and mobility.  No new issues or concerns.  No acute distress.      Objective  Vital signs:  121/76, 98%    Physical Exam  Constitutional:       General: She is not in acute distress.  Eyes:      Extraocular Movements: Extraocular movements intact.   Cardiovascular:      Rate and Rhythm: Normal rate and regular rhythm.   Pulmonary:      Effort: Pulmonary effort is normal.      Breath sounds: Normal breath sounds.   Abdominal:      General: Bowel sounds are normal.      Palpations: Abdomen is soft.   Musculoskeletal:      Cervical back: Neck supple.      Right lower leg: No edema.      Left lower leg: No edema.   Neurological:      Mental Status: She is alert.   Psychiatric:         Mood and Affect: Mood normal.         Behavior: Behavior is cooperative.         Assessment/Plan  Problem List Items Addressed This Visit          Neuro    Severe late onset Alzheimer's dementia without behavioral disturbance, psychotic disturbance, mood disturbance, or anxiety (CMS/[facility])     Secured unit for safety  Mentation at baseline    Monitor for changes in mentation or behaviors.            Symptoms and Signs    Weakness       Continue therapy          Medications, treatments, and labs reviewed  Continue medications and treatments as listed in [facility]    Scribe Attestation  By signing my name below, Rocio SMALLS Scribe   attest that this documentation has been prepared under the direction and in the presence of Hong Hope MD.    Provider Attestation - Scribe documentation  All medical record entries made by the Scribe were at my direction  and personally dictated by me. I have reviewed the chart and agree that the record accurately reflects my personal performance of the history, physical exam, discussion and plan.    1. Severe late onset Alzheimer's dementia without behavioral disturbance, psychotic disturbance, mood disturbance, or anxiety (CMS/HCC)        2. Weakness               Electronically Signed By: Hong Hope MD   7/16/23  9:25 AM

## 2023-07-19 NOTE — PROGRESS NOTES
PROGRESS NOTE    Subjective   Chief complaint: Juani Sinclair is a 88 y.o. female who is a long term care patient being seen and evaluated for  weakness    HPI:   patient with dementia, mentation at baseline is working in therapy.  She requires assistance with transfers ADLs and mobility.  No new issues or concerns.  No acute distress.      Objective   Vital signs:   136/74, 98%    Physical Exam  Constitutional:       General: She is not in acute distress.  Eyes:      Extraocular Movements: Extraocular movements intact.   Cardiovascular:      Rate and Rhythm: Normal rate and regular rhythm.   Pulmonary:      Effort: Pulmonary effort is normal.      Breath sounds: Normal breath sounds.   Abdominal:      General: Bowel sounds are normal.      Palpations: Abdomen is soft.   Musculoskeletal:      Cervical back: Neck supple.      Right lower leg: No edema.      Left lower leg: No edema.   Neurological:      Mental Status: She is alert.   Psychiatric:         Mood and Affect: Mood normal.         Behavior: Behavior is cooperative.         Assessment/Plan   Problem List Items Addressed This Visit          Neuro    Severe late onset Alzheimer's dementia without behavioral disturbance, psychotic disturbance, mood disturbance, or anxiety (CMS/[facility])     Secured unit for safety  Mentation at baseline    Monitor for changes in mentation or behaviors.            Symptoms and Signs    Weakness       Continue therapy          Medications, treatments, and labs reviewed  Continue medications and treatments as listed in [facility]    Scribe Attestation  By signing my name below, I, Mihir Moreno   attest that this documentation has been prepared under the direction and in the presence of Hong Hope MD.    Provider Attestation - Scribe documentation  All medical record entries made by the Scribe were at my direction and personally dictated by me. I have reviewed the chart and agree that the record accurately reflects my  personal performance of the history, physical exam, discussion and plan.    1. Severe late onset Alzheimer's dementia without behavioral disturbance, psychotic disturbance, mood disturbance, or anxiety (CMS/HCC)        2. Weakness

## 2023-07-19 NOTE — LETTER
Patient: Juani Sinclair  : 1934    Encounter Date: 2023    PROGRESS NOTE    Subjective  Chief complaint: Juani Sinclair is a 88 y.o. female who is a long term care patient being seen and evaluated for  weakness    HPI:   patient with dementia, mentation at baseline is working in therapy.  She requires assistance with transfers ADLs and mobility.  No new issues or concerns.  No acute distress.      Objective  Vital signs:   136/74, 98%    Physical Exam  Constitutional:       General: She is not in acute distress.  Eyes:      Extraocular Movements: Extraocular movements intact.   Cardiovascular:      Rate and Rhythm: Normal rate and regular rhythm.   Pulmonary:      Effort: Pulmonary effort is normal.      Breath sounds: Normal breath sounds.   Abdominal:      General: Bowel sounds are normal.      Palpations: Abdomen is soft.   Musculoskeletal:      Cervical back: Neck supple.      Right lower leg: No edema.      Left lower leg: No edema.   Neurological:      Mental Status: She is alert.   Psychiatric:         Mood and Affect: Mood normal.         Behavior: Behavior is cooperative.         Assessment/Plan  Problem List Items Addressed This Visit          Neuro    Severe late onset Alzheimer's dementia without behavioral disturbance, psychotic disturbance, mood disturbance, or anxiety (CMS/[facility])     Secured unit for safety  Mentation at baseline    Monitor for changes in mentation or behaviors.            Symptoms and Signs    Weakness       Continue therapy          Medications, treatments, and labs reviewed  Continue medications and treatments as listed in [facility]    Scribe Attestation  By signing my name below, Rocio SMALLS Scribe   attest that this documentation has been prepared under the direction and in the presence of Hong Hope MD.    Provider Attestation - Scribe documentation  All medical record entries made by the Scribe were at my direction and personally dictated by me. I have  reviewed the chart and agree that the record accurately reflects my personal performance of the history, physical exam, discussion and plan.    1. Severe late onset Alzheimer's dementia without behavioral disturbance, psychotic disturbance, mood disturbance, or anxiety (CMS/[facility])        2. Weakness               Electronically Signed By: Hong Hope MD   7/19/23  4:59 PM

## 2023-07-19 NOTE — ASSESSMENT & PLAN NOTE
Secured unit for safety  Mentation at baseline    Monitor for changes in mentation or behaviors.   You can access the FollowMyHealth Patient Portal offered by [facility] by registering at the following website: [email or web address]. By joining [employer]’s FollowMyHealth portal, you will also be able to view your health information using other applications (apps) compatible with our system.

## 2023-07-24 NOTE — LETTER
Patient: Juani Sinclair  : 1934    Encounter Date: 2023    PROGRESS NOTE    Subjective  Chief complaint: Juani Sinclair is a 89 y.o. female who is a long term care patient being seen and evaluated for COVID.     HPI:  Patient presents with positive COVID result  per nursing staff. Order to start antiviral. Patient has been sleepy but denies SOB, n,v,f,c. No new nursing concerns.       Objective  Vital signs: 140/71,84,18,97.3,97% RA    Physical Exam  Constitutional:       General: She is not in acute distress.  Eyes:      Extraocular Movements: Extraocular movements intact.   Cardiovascular:      Rate and Rhythm: Normal rate and regular rhythm.   Pulmonary:      Effort: Pulmonary effort is normal.      Breath sounds: Normal breath sounds.   Musculoskeletal:      Cervical back: Neck supple.      Right lower leg: No edema.      Left lower leg: No edema.      Comments: Generalized weakness   Neurological:      Mental Status: She is alert.   Psychiatric:         Mood and Affect: Mood normal.         Behavior: Behavior is cooperative.         Assessment/Plan  Problem List Items Addressed This Visit       Chronic diastolic congestive heart failure (CMS/HCC)     Stable  Lasix   Monitor weight  GARFIELD diet         COVID - Primary     Antiviral  Isolation precautions  Supportive treatment         Hypertension, essential     BP at goal  Continue antihypertensives  Monitor BP         Severe late onset Alzheimer's dementia without behavioral disturbance, psychotic disturbance, mood disturbance, or anxiety (CMS/HCC)     Secured unit for safety          Medications, treatments, and labs reviewed  Continue medications and treatments as listed in EMR      Scribe Attestation  I, Mihir Hoang   attest that this documentation has been prepared under the direction and in the presence of NIKKO Anderson.     Provider Attestation - Scribe documentation  All medical record entries made by the  Scribe were at my direction and personally dictated by me. I have reviewed the chart and agree that the record accurately reflects my personal performance of the history, physical exam, discussion and plan.   NIKKO Anderson      Electronically Signed By: NIKKO Anderson   8/3/23  3:46 PM

## 2023-07-25 PROBLEM — U07.1 COVID: Status: ACTIVE | Noted: 2023-01-01

## 2023-07-25 PROBLEM — I50.32 CHRONIC DIASTOLIC CONGESTIVE HEART FAILURE (MULTI): Status: ACTIVE | Noted: 2023-01-01

## 2023-07-25 NOTE — PROGRESS NOTES
PROGRESS NOTE    Subjective   Chief complaint: Juani Sinclair is a 89 y.o. female who is a long term care patient being seen and evaluated for COVID.     HPI:  Patient presents with positive COVID result 7/22 per nursing staff. Order to start antiviral. Patient has been sleepy but denies SOB, n,v,f,c. No new nursing concerns.       Objective   Vital signs: 140/71,84,18,97.3,97% RA    Physical Exam  Constitutional:       General: She is not in acute distress.  Eyes:      Extraocular Movements: Extraocular movements intact.   Cardiovascular:      Rate and Rhythm: Normal rate and regular rhythm.   Pulmonary:      Effort: Pulmonary effort is normal.      Breath sounds: Normal breath sounds.   Musculoskeletal:      Cervical back: Neck supple.      Right lower leg: No edema.      Left lower leg: No edema.      Comments: Generalized weakness   Neurological:      Mental Status: She is alert.   Psychiatric:         Mood and Affect: Mood normal.         Behavior: Behavior is cooperative.         Assessment/Plan   Problem List Items Addressed This Visit       Chronic diastolic congestive heart failure (CMS/HCC)     Stable  Lasix   Monitor weight  GARFIELD diet         COVID - Primary     Antiviral  Isolation precautions  Supportive treatment         Hypertension, essential     BP at goal  Continue antihypertensives  Monitor BP         Severe late onset Alzheimer's dementia without behavioral disturbance, psychotic disturbance, mood disturbance, or anxiety (CMS/HCC)     Secured unit for safety          Medications, treatments, and labs reviewed  Continue medications and treatments as listed in EMR      Scribe Attestation  IEugenia Scribe   attest that this documentation has been prepared under the direction and in the presence of NIKKO Anderson.     Provider Attestation - Scribe documentation  All medical record entries made by the Scribe were at my direction and personally dictated by me. I have reviewed  the chart and agree that the record accurately reflects my personal performance of the history, physical exam, discussion and plan.   Mandy Small, APRN-CNP

## 2023-07-25 NOTE — LETTER
Patient: Juani Sinclair  : 1934    Encounter Date: 2023    PROGRESS NOTE    Subjective  Chief complaint: Juani Sinclair is a 88 y.o. female who is a long term care patient being seen and evaluated for follow-up covid    HPI:  Patient remains in isolation for COVID virus.  Patient seen and examined at bedside in no apparent distress.  Remains on antiviral.  Denies shortness of breath fever chills.        Objective  Vital signs: 140/71, 97.3, 97%, 18, 84    Physical Exam  Constitutional:       General: She is not in acute distress.  Eyes:      Extraocular Movements: Extraocular movements intact.   Cardiovascular:      Rate and Rhythm: Normal rate and regular rhythm.   Pulmonary:      Effort: Pulmonary effort is normal.      Breath sounds: Decreased breath sounds present.   Musculoskeletal:      Cervical back: Neck supple.   Neurological:      Mental Status: She is alert.   Psychiatric:         Mood and Affect: Mood normal.         Behavior: Behavior is cooperative.         Assessment/Plan  Problem List Items Addressed This Visit       Chronic diastolic congestive heart failure (CMS/HCC)     Stable  Lasix   Monitor weight  GARFIELD diet         COVID - Primary     Antiviral  Isolation precautions  Supportive treatment         Hypertension, essential     BP at goal  Continue antihypertensives  Monitor BP         Severe late onset Alzheimer's dementia without behavioral disturbance, psychotic disturbance, mood disturbance, or anxiety (CMS/HCC)     Secured unit for safety          Medications, treatments, and labs reviewed  Continue medications and treatments as listed in EMR    NIKKO Anderson      Electronically Signed By: NIKKO Anderson   23  5:42 PM

## 2023-07-25 NOTE — PROGRESS NOTES
PROGRESS NOTE    Subjective   Chief complaint: Juani Sinclair is a 88 y.o. female who is a long term care patient being seen and evaluated for follow-up covid    HPI:  Patient remains in isolation for COVID virus.  Patient seen and examined at bedside in no apparent distress.  Remains on antiviral.  Denies shortness of breath fever chills.        Objective   Vital signs: 140/71, 97.3, 97%, 18, 84    Physical Exam  Constitutional:       General: She is not in acute distress.  Eyes:      Extraocular Movements: Extraocular movements intact.   Cardiovascular:      Rate and Rhythm: Normal rate and regular rhythm.   Pulmonary:      Effort: Pulmonary effort is normal.      Breath sounds: Decreased breath sounds present.   Musculoskeletal:      Cervical back: Neck supple.   Neurological:      Mental Status: She is alert.   Psychiatric:         Mood and Affect: Mood normal.         Behavior: Behavior is cooperative.         Assessment/Plan   Problem List Items Addressed This Visit       Chronic diastolic congestive heart failure (CMS/HCC)     Stable  Lasix   Monitor weight  GARFIELD diet         COVID - Primary     Antiviral  Isolation precautions  Supportive treatment         Hypertension, essential     BP at goal  Continue antihypertensives  Monitor BP         Severe late onset Alzheimer's dementia without behavioral disturbance, psychotic disturbance, mood disturbance, or anxiety (CMS/HCC)     Secured unit for safety          Medications, treatments, and labs reviewed  Continue medications and treatments as listed in EMR    Mandy Small, APRN-CNP

## 2023-07-26 NOTE — PROGRESS NOTES
PROGRESS NOTE    Subjective   Chief complaint: Juani Sinclair is a 88 y.o. female who is a long term care patient being seen and evaluated for   COVID-19    HPI:   patient with CHF denies shortness of breath orthopnea.  Patient tested positive for COVID-19 and was placed in isolation.  Denies shortness of breath, fever, chills, GI issues headache or body aches.  Patient also working on therapy for weakness.  Requires assistance for transfers ADLs and mobility.  No acute distress.      Objective   Vital signs:  121/76, 98%    Physical Exam  Constitutional:       General: She is not in acute distress.  Eyes:      Extraocular Movements: Extraocular movements intact.   Cardiovascular:      Rate and Rhythm: Normal rate and regular rhythm.   Pulmonary:      Effort: Pulmonary effort is normal.      Breath sounds: Normal breath sounds.   Abdominal:      General: Bowel sounds are normal.      Palpations: Abdomen is soft.   Musculoskeletal:      Cervical back: Neck supple.      Right lower leg: No edema.      Left lower leg: No edema.   Neurological:      Mental Status: She is alert.   Psychiatric:         Mood and Affect: Mood normal.         Behavior: Behavior is cooperative.         Assessment/Plan   Problem List Items Addressed This Visit          Cardiac and Vasculature    Chronic diastolic congestive heart failure (CMS/HCC)     Stable  Lasix   Monitor weight  GARFIELD diet            Infectious Diseases    COVID - Primary     Antiviral  Isolation precautions  Supportive treatment            Symptoms and Signs    Weakness       Continue therapy          Medications, treatments, and labs reviewed  Continue medications and treatments as listed in PCC    Scribe Attestation  By signing my name below, Rocio SMALLS, Shantanuibwander   attest that this documentation has been prepared under the direction and in the presence of Hong Hope MD.    Provider Attestation - Scribe documentation  All medical record entries made by the  Mihir were at my direction and personally dictated by me. I have reviewed the chart and agree that the record accurately reflects my personal performance of the history, physical exam, discussion and plan.    1. COVID        2. Chronic diastolic congestive heart failure (CMS/HCC)        3. Weakness

## 2023-07-26 NOTE — LETTER
Patient: Juani Sinclair  : 1934    Encounter Date: 2023    PROGRESS NOTE    Subjective  Chief complaint: Juani Sinclair is a 88 y.o. female who is a long term care patient being seen and evaluated for   COVID-19    HPI:   patient with CHF denies shortness of breath orthopnea.  Patient tested positive for COVID-19 and was placed in isolation.  Denies shortness of breath, fever, chills, GI issues headache or body aches.  Patient also working on therapy for weakness.  Requires assistance for transfers ADLs and mobility.  No acute distress.      Objective  Vital signs:  121/76, 98%    Physical Exam  Constitutional:       General: She is not in acute distress.  Eyes:      Extraocular Movements: Extraocular movements intact.   Cardiovascular:      Rate and Rhythm: Normal rate and regular rhythm.   Pulmonary:      Effort: Pulmonary effort is normal.      Breath sounds: Normal breath sounds.   Abdominal:      General: Bowel sounds are normal.      Palpations: Abdomen is soft.   Musculoskeletal:      Cervical back: Neck supple.      Right lower leg: No edema.      Left lower leg: No edema.   Neurological:      Mental Status: She is alert.   Psychiatric:         Mood and Affect: Mood normal.         Behavior: Behavior is cooperative.         Assessment/Plan  Problem List Items Addressed This Visit          Cardiac and Vasculature    Chronic diastolic congestive heart failure (CMS/HCC)     Stable  Lasix   Monitor weight  GARFIELD diet            Infectious Diseases    COVID - Primary     Antiviral  Isolation precautions  Supportive treatment            Symptoms and Signs    Weakness       Continue therapy          Medications, treatments, and labs reviewed  Continue medications and treatments as listed in PCC    Scribe Attestation  By signing my name below, IRocio, Scribe   attest that this documentation has been prepared under the direction and in the presence of Hong Hope MD.    Provider  Attestation - Scribe documentation  All medical record entries made by the Scribe were at my direction and personally dictated by me. I have reviewed the chart and agree that the record accurately reflects my personal performance of the history, physical exam, discussion and plan.    1. COVID        2. Chronic diastolic congestive heart failure (CMS/HCC)        3. Weakness               Electronically Signed By: Hong Hope MD   7/26/23  4:36 PM

## 2023-07-27 NOTE — Clinical Note
Patient: Juani Sinclair  : 1934    Encounter Date: 2023    PROGRESS NOTE    Subjective  Chief complaint: Juani Sinclair is a 88 y.o. female who is a long term care patient being seen and evaluated for follow-up of Covid.    HPI:   Patient being seen for follow-up of COVID.  Started on antiviral.  Remains in isolation.  Denies nausea vomiting fever and chills.      Objective  Vital signs:   18, 140/71, 97.3, 84, 97%  Physical Exam  Constitutional:       General: She is not in acute distress.  Eyes:      Extraocular Movements: Extraocular movements intact.   Cardiovascular:      Rate and Rhythm: Normal rate and regular rhythm.   Pulmonary:      Effort: Pulmonary effort is normal.   Musculoskeletal:      Cervical back: Neck supple.   Neurological:      Mental Status: She is alert.   Psychiatric:         Mood and Affect: Mood normal.         Behavior: Behavior is cooperative.         Assessment/Plan  Problem List Items Addressed This Visit    None    Medications, treatments, and labs reviewed  Continue medications and treatments as listed in EMR    Scribe Attestation  IIsidra Scribe   attest that this documentation has been prepared under the direction and in the presence of NIKKO Anderson    Provider Attestation - Scribe documentation  All medical record entries made by the Scribe were at my direction and personally dictated by me. I have reviewed the chart and agree that the record accurately reflects my personal performance of the history, physical exam, discussion and plan.   NIKKO Anderson          Electronically Signed By: NIKKO Anderson   8/3/23  3:46 PM

## 2023-07-28 NOTE — PROGRESS NOTES
PROGRESS NOTE    Subjective   Chief complaint: Juani Sinclair is a 88 y.o. female who is a long term care patient being seen and evaluated for   COVID-19    HPI:   patient with CHF denies shortness of breath orthopnea.  Patient  positive for COVID-19 remains in isolation.  Denies shortness of breath, fever, chills, GI issues headache or body aches.   No acute distress.      Objective   Vital signs:  121/76, 98%    Physical Exam  Constitutional:       General: She is not in acute distress.  Eyes:      Extraocular Movements: Extraocular movements intact.   Cardiovascular:      Rate and Rhythm: Normal rate and regular rhythm.   Pulmonary:      Effort: Pulmonary effort is normal.      Breath sounds: Normal breath sounds.   Abdominal:      General: Bowel sounds are normal.      Palpations: Abdomen is soft.   Musculoskeletal:      Cervical back: Neck supple.      Right lower leg: No edema.      Left lower leg: No edema.   Neurological:      Mental Status: She is alert.   Psychiatric:         Mood and Affect: Mood normal.         Behavior: Behavior is cooperative.         Assessment/Plan   Problem List Items Addressed This Visit       Chronic diastolic congestive heart failure (CMS/HCC) - Primary     Stable  Lasix   Monitor weight  GARFIELD diet         COVID     Antiviral  Isolation precautions  Supportive treatment        Medications, treatments, and labs reviewed  Continue medications and treatments as listed in PCC    Scribe Attestation  By signing my name below, IRocio Scribe   attest that this documentation has been prepared under the direction and in the presence of Hong Hope MD.    Provider Attestation - Scribe documentation  All medical record entries made by the Scribe were at my direction and personally dictated by me. I have reviewed the chart and agree that the record accurately reflects my personal performance of the history, physical exam, discussion and plan.    1. Chronic diastolic  congestive heart failure (CMS/HCC)        2. COVID

## 2023-07-28 NOTE — LETTER
Patient: Juani Sinclair  : 1934    Encounter Date: 2023    PROGRESS NOTE    Subjective  Chief complaint: Juani Sinclair is a 88 y.o. female who is a long term care patient being seen and evaluated for   COVID-19    HPI:   patient with CHF denies shortness of breath orthopnea.  Patient  positive for COVID-19 remains in isolation.  Denies shortness of breath, fever, chills, GI issues headache or body aches.   No acute distress.      Objective  Vital signs:  121/76, 98%    Physical Exam  Constitutional:       General: She is not in acute distress.  Eyes:      Extraocular Movements: Extraocular movements intact.   Cardiovascular:      Rate and Rhythm: Normal rate and regular rhythm.   Pulmonary:      Effort: Pulmonary effort is normal.      Breath sounds: Normal breath sounds.   Abdominal:      General: Bowel sounds are normal.      Palpations: Abdomen is soft.   Musculoskeletal:      Cervical back: Neck supple.      Right lower leg: No edema.      Left lower leg: No edema.   Neurological:      Mental Status: She is alert.   Psychiatric:         Mood and Affect: Mood normal.         Behavior: Behavior is cooperative.         Assessment/Plan  Problem List Items Addressed This Visit       Chronic diastolic congestive heart failure (CMS/HCC) - Primary     Stable  Lasix   Monitor weight  GARFIELD diet         COVID     Antiviral  Isolation precautions  Supportive treatment        Medications, treatments, and labs reviewed  Continue medications and treatments as listed in PCC    Scribe Attestation  By signing my name below, IRocio Scribe   attest that this documentation has been prepared under the direction and in the presence of Hong Hope MD.    Provider Attestation - Scribe documentation  All medical record entries made by the Scribe were at my direction and personally dictated by me. I have reviewed the chart and agree that the record accurately reflects my personal performance of the history,  physical exam, discussion and plan.    1. Chronic diastolic congestive heart failure (CMS/HCC)        2. COVID               Electronically Signed By: Hong Hope MD   7/28/23  4:04 PM

## 2023-07-31 NOTE — PROGRESS NOTES
PROGRESS NOTE    Subjective   Chief complaint: Juani Sinclair is a 89 y.o. female who is a long term care patient being seen and evaluated for follow-up of Covid.    HPI:   Patient being seen for follow-up of COVID.  Started on antiviral.  Remains in isolation.  Denies nausea vomiting fever and chills.      Objective   Vital signs:   18, 140/71, 97.3, 84, 97%  Physical Exam  Constitutional:       General: She is not in acute distress.  Eyes:      Extraocular Movements: Extraocular movements intact.   Cardiovascular:      Rate and Rhythm: Normal rate and regular rhythm.   Pulmonary:      Effort: Pulmonary effort is normal.   Musculoskeletal:      Cervical back: Neck supple.   Neurological:      Mental Status: She is alert.   Psychiatric:         Mood and Affect: Mood normal.         Behavior: Behavior is cooperative.         Assessment/Plan   Problem List Items Addressed This Visit       COVID - Primary     Antiviral  Isolation precautions  Supportive treatment         Hypertension, essential     BP at goal  Continue antihypertensives  Monitor BP         Severe late onset Alzheimer's dementia without behavioral disturbance, psychotic disturbance, mood disturbance, or anxiety (CMS/[facility])     Secured unit for safety          Medications, treatments, and labs reviewed  Continue medications and treatments as listed in EMR    Scribe Attestation  IIsidra Scribe   attest that this documentation has been prepared under the direction and in the presence of NIKKO Anderson    Provider Attestation - Scribe documentation  All medical record entries made by the Scribe were at my direction and personally dictated by me. I have reviewed the chart and agree that the record accurately reflects my personal performance of the history, physical exam, discussion and plan.   NIKKO Anderson

## 2023-07-31 NOTE — LETTER
Patient: Juani Sinclair  : 1934    Encounter Date: 2023    PROGRESS NOTE    Subjective  Chief complaint: Juani Sinclair is a 89 y.o. female who is a long term care patient being seen and evaluated for COVID.    HPI:  Patient present as positive for COVID. Patient remains in isolation. Patient has been sleepy but in no apparent distress. Pt is afebrile and stable.       Objective  Vital signs: 140/71,84,18,97.3,97% RA    Physical Exam  Constitutional:       General: She is not in acute distress.  Eyes:      Extraocular Movements: Extraocular movements intact.   Cardiovascular:      Rate and Rhythm: Normal rate and regular rhythm.   Pulmonary:      Effort: Pulmonary effort is normal.   Musculoskeletal:      Cervical back: Neck supple.         Assessment/Plan  Problem List Items Addressed This Visit       COVID - Primary     Antiviral complete  Isolation precautions  Supportive treatment         Hypertension, essential     BP at goal  Continue antihypertensives  Monitor BP         Severe late onset Alzheimer's dementia without behavioral disturbance, psychotic disturbance, mood disturbance, or anxiety (CMS/[facility])     Secured unit for safety          Medications, treatments, and labs reviewed  Continue medications and treatments as listed in EMR      Scribe Attestation  Eugenia SMALLS Scribe   attest that this documentation has been prepared under the direction and in the presence of NIKKO Anderson.     Provider Attestation - Scribe documentation  All medical record entries made by the Scribe were at my direction and personally dictated by me. I have reviewed the chart and agree that the record accurately reflects my personal performance of the history, physical exam, discussion and plan.   NIKKO Anderson      Electronically Signed By: NIKKO Anderson   23  6:00 PM

## 2023-08-01 NOTE — PROGRESS NOTES
PROGRESS NOTE    Subjective   Chief complaint: Juani Sinclair is a 89 y.o. female who is a long term care patient being seen and evaluated for COVID.    HPI:  Patient present as positive for COVID. Patient remains in isolation. Patient has been sleepy but in no apparent distress. Pt is afebrile and stable.       Objective   Vital signs: 140/71,84,18,97.3,97% RA    Physical Exam  Constitutional:       General: She is not in acute distress.  Eyes:      Extraocular Movements: Extraocular movements intact.   Cardiovascular:      Rate and Rhythm: Normal rate and regular rhythm.   Pulmonary:      Effort: Pulmonary effort is normal.   Musculoskeletal:      Cervical back: Neck supple.         Assessment/Plan   Problem List Items Addressed This Visit       COVID - Primary     Antiviral complete  Isolation precautions  Supportive treatment         Hypertension, essential     BP at goal  Continue antihypertensives  Monitor BP         Severe late onset Alzheimer's dementia without behavioral disturbance, psychotic disturbance, mood disturbance, or anxiety (CMS/[facility])     Secured unit for safety          Medications, treatments, and labs reviewed  Continue medications and treatments as listed in EMR      Scribe Attestation  IEugenia Scribe   attest that this documentation has been prepared under the direction and in the presence of NIKKO Anderson.     Provider Attestation - Scribe documentation  All medical record entries made by the Scribe were at my direction and personally dictated by me. I have reviewed the chart and agree that the record accurately reflects my personal performance of the history, physical exam, discussion and plan.   NIKKO Anderson

## 2023-08-01 NOTE — LETTER
Patient: Juani Sinclair  : 1934    Encounter Date: 2023    PROGRESS NOTE    Subjective  Chief complaint: Jauni Sinclair is a 89 y.o. female who is a long term care patient being seen and evaluated for follow-up of Covid.    HPI:  Patient is on isolation precautions due to recent diagnosis of COVID virus on .  Order given to start antiviral.  Patient is stable and in no apparent distress.  Noted to be sleepy according to the nurse.  Afebrile.        Objective  Vital signs: 18, 140/71, 97.3, 84, 97%  Physical Exam  Constitutional:       General: She is not in acute distress.  Eyes:      Extraocular Movements: Extraocular movements intact.   Cardiovascular:      Rate and Rhythm: Normal rate and regular rhythm.   Pulmonary:      Effort: Pulmonary effort is normal.   Musculoskeletal:      Cervical back: Neck supple.   Psychiatric:         Mood and Affect: Mood normal.         Behavior: Behavior is cooperative.         Assessment/Plan  Problem List Items Addressed This Visit       COVID - Primary     Stable  Antiviral complete  Isolation precautions  Supportive treatment         Hypertension, essential     BP at goal  Continue antihypertensives  Monitor BP         Severe late onset Alzheimer's dementia without behavioral disturbance, psychotic disturbance, mood disturbance, or anxiety (CMS/[facility])     Secured unit for safety          Medications, treatments, and labs reviewed  Continue medications and treatments as listed in EMR    Scribe Attestation  IIsidra Scribe   attest that this documentation has been prepared under the direction and in the presence of NAVI Anderson-CNP    Provider Attestation - Scribe documentation  All medical record entries made by the Scribe were at my direction and personally dictated by me. I have reviewed the chart and agree that the record accurately reflects my personal performance of the history, physical exam, discussion and plan.   Mandy Small,  APRN-CNP        Electronically Signed By: NIKKO Anderson   8/12/23  5:46 PM

## 2023-08-02 NOTE — PROGRESS NOTES
PROGRESS NOTE    Subjective   Chief complaint: Juani Sinclair is a 89 y.o. female who is a long term care patient being seen and evaluated for   COVID-19    HPI:   patient with ALZ, mentation at baseline,  remains in isolation for Covid 19.  Denies  fever, chills, GI issues headache or body aches. Denies SOB or orthopnea.   No acute distress.      Objective   Vital signs:  121/76, 98%    Physical Exam  Constitutional:       General: She is not in acute distress.  Eyes:      Extraocular Movements: Extraocular movements intact.   Cardiovascular:      Rate and Rhythm: Normal rate and regular rhythm.   Pulmonary:      Effort: Pulmonary effort is normal.      Breath sounds: Normal breath sounds.   Abdominal:      General: Bowel sounds are normal.      Palpations: Abdomen is soft.   Musculoskeletal:      Cervical back: Neck supple.      Right lower leg: No edema.      Left lower leg: No edema.   Neurological:      Mental Status: She is alert.   Psychiatric:         Mood and Affect: Mood normal.         Behavior: Behavior is cooperative.         Assessment/Plan   Problem List Items Addressed This Visit       Chronic diastolic congestive heart failure (CMS/HCC) - Primary     Stable  Lasix   Monitor weight  GARFEILD diet         Severe late onset Alzheimer's dementia without behavioral disturbance, psychotic disturbance, mood disturbance, or anxiety (CMS/HCC)     Secured unit for safety         COVID     Antiviral  Isolation precautions  Supportive treatment        Medications, treatments, and labs reviewed  Continue medications and treatments as listed in PCC    Scribe Attestation  By signing my name below, IRocio Scribe   attest that this documentation has been prepared under the direction and in the presence of Hong Hope MD.    Provider Attestation - Scribe documentation  All medical record entries made by the Scribe were at my direction and personally dictated by me. I have reviewed the chart and agree  that the record accurately reflects my personal performance of the history, physical exam, discussion and plan.    1. Chronic diastolic congestive heart failure (CMS/HCC)        2. COVID        3. Severe late onset Alzheimer's dementia without behavioral disturbance, psychotic disturbance, mood disturbance, or anxiety (CMS/HCC)

## 2023-08-02 NOTE — PROGRESS NOTES
PROGRESS NOTE    Subjective   Chief complaint: Juani Sinclair is a 89 y.o. female who is a long term care patient being seen and evaluated for follow-up of Covid.    HPI:  Patient is on isolation precautions due to recent diagnosis of COVID virus on 7/22.  Order given to start antiviral.  Patient is stable and in no apparent distress.  Noted to be sleepy according to the nurse.  Afebrile.        Objective   Vital signs: 18, 140/71, 97.3, 84, 97%  Physical Exam  Constitutional:       General: She is not in acute distress.  Eyes:      Extraocular Movements: Extraocular movements intact.   Cardiovascular:      Rate and Rhythm: Normal rate and regular rhythm.   Pulmonary:      Effort: Pulmonary effort is normal.   Musculoskeletal:      Cervical back: Neck supple.   Psychiatric:         Mood and Affect: Mood normal.         Behavior: Behavior is cooperative.         Assessment/Plan   Problem List Items Addressed This Visit       COVID - Primary     Stable  Antiviral complete  Isolation precautions  Supportive treatment         Hypertension, essential     BP at goal  Continue antihypertensives  Monitor BP         Severe late onset Alzheimer's dementia without behavioral disturbance, psychotic disturbance, mood disturbance, or anxiety (CMS/[facility])     Secured unit for safety          Medications, treatments, and labs reviewed  Continue medications and treatments as listed in EMR    Scribe Attestation  IIsidra Scribe   attest that this documentation has been prepared under the direction and in the presence of NIKKO Anderson    Provider Attestation - Scribe documentation  All medical record entries made by the Scribe were at my direction and personally dictated by me. I have reviewed the chart and agree that the record accurately reflects my personal performance of the history, physical exam, discussion and plan.   NIKKO Anderson

## 2023-08-02 NOTE — LETTER
Patient: Juani Sinclair  : 1934    Encounter Date: 2023    PROGRESS NOTE    Subjective  Chief complaint: Juani Sinclair is a 89 y.o. female who is a long term care patient being seen and evaluated for   COVID-19    HPI:   patient with ALZ, mentation at baseline,  remains in isolation for Covid 19.  Denies  fever, chills, GI issues headache or body aches. Denies SOB or orthopnea.   No acute distress.      Objective  Vital signs:  121/76, 98%    Physical Exam  Constitutional:       General: She is not in acute distress.  Eyes:      Extraocular Movements: Extraocular movements intact.   Cardiovascular:      Rate and Rhythm: Normal rate and regular rhythm.   Pulmonary:      Effort: Pulmonary effort is normal.      Breath sounds: Normal breath sounds.   Abdominal:      General: Bowel sounds are normal.      Palpations: Abdomen is soft.   Musculoskeletal:      Cervical back: Neck supple.      Right lower leg: No edema.      Left lower leg: No edema.   Neurological:      Mental Status: She is alert.   Psychiatric:         Mood and Affect: Mood normal.         Behavior: Behavior is cooperative.         Assessment/Plan  Problem List Items Addressed This Visit       Chronic diastolic congestive heart failure (CMS/HCC) - Primary     Stable  Lasix   Monitor weight  GARFIELD diet         Severe late onset Alzheimer's dementia without behavioral disturbance, psychotic disturbance, mood disturbance, or anxiety (CMS/HCC)     Secured unit for safety         COVID     Antiviral  Isolation precautions  Supportive treatment        Medications, treatments, and labs reviewed  Continue medications and treatments as listed in [facility]    Scribe Attestation  By signing my name below, Rocio SMALLS Scribe   attest that this documentation has been prepared under the direction and in the presence of Hong Hope MD.    Provider Attestation - Scribe documentation  All medical record entries made by the Scribe were at my direction  and personally dictated by me. I have reviewed the chart and agree that the record accurately reflects my personal performance of the history, physical exam, discussion and plan.    1. Chronic diastolic congestive heart failure (CMS/HCC)        2. COVID        3. Severe late onset Alzheimer's dementia without behavioral disturbance, psychotic disturbance, mood disturbance, or anxiety (CMS/HCC)               Electronically Signed By: Hong Hope MD   8/2/23  3:34 PM

## 2023-08-09 NOTE — LETTER
Patient: Juani Sinclair  : 1934    Encounter Date: 2023    PROGRESS NOTE    Subjective  Chief complaint: Juani Sinclair is a 89 y.o. female who is a long term care patient being seen and evaluated for monthly general medical care and follow-up.    HPI:   patient presents for general medical care and f/u.  Patient seen and examined at bedside.  No issues per nursing.  Patient has no acute complaints.   denies sob, orthopnea, weight gain.  Patient with diagnosis of depression.  Denies feeling down and thoughts of harming self or others.  No acute distress.  Patient has dementia and requires assist with ADLs.  No acute distress.       Objective  Vital signs:  120/66,  94%    Physical Exam  Constitutional:       General: She is not in acute distress.  Eyes:      Extraocular Movements: Extraocular movements intact.   Cardiovascular:      Rate and Rhythm: Normal rate and regular rhythm.   Pulmonary:      Effort: Pulmonary effort is normal.      Breath sounds: Normal breath sounds.   Abdominal:      General: Bowel sounds are normal.      Palpations: Abdomen is soft.   Musculoskeletal:         General: Normal range of motion.      Cervical back: Normal range of motion and neck supple.      Right lower leg: No edema.      Left lower leg: No edema.   Neurological:      Mental Status: She is alert.   Psychiatric:         Mood and Affect: Mood normal.         Behavior: Behavior is cooperative.         Assessment/Plan  Problem List Items Addressed This Visit       Depression with anxiety     Mood stable  Monitor for changes in mod and behavior           Chronic diastolic congestive heart failure (CMS/HCC)     Stable  Lasix   Monitor weight  GARFIELD diet         Severe late onset Alzheimer's dementia without behavioral disturbance, psychotic disturbance, mood disturbance, or anxiety (CMS/HCC) - Primary     Secured unit for safety        Medications, treatments, and labs reviewed  Continue medications and treatments  as listed in [facility]    Scribe Attestation  By signing my name below, I, Shantanu Morenoibe   attest that this documentation has been prepared under the direction and in the presence of Hong Hope MD.    Provider Attestation - Scribe documentation  All medical record entries made by the Scribe were at my direction and personally dictated by me. I have reviewed the chart and agree that the record accurately reflects my personal performance of the history, physical exam, discussion and plan.  1. Severe late onset Alzheimer's dementia without behavioral disturbance, psychotic disturbance, mood disturbance, or anxiety (CMS/HCC)        2. Chronic diastolic congestive heart failure (CMS/HCC)        3. Depression with anxiety              Electronically Signed By: Hong Hope MD   8/9/23  2:04 PM

## 2023-08-09 NOTE — PROGRESS NOTES
PROGRESS NOTE    Subjective   Chief complaint: Juani Sinclair is a 89 y.o. female who is a long term care patient being seen and evaluated for monthly general medical care and follow-up.    HPI:   patient presents for general medical care and f/u.  Patient seen and examined at bedside.  No issues per nursing.  Patient has no acute complaints.   denies sob, orthopnea, weight gain.  Patient with diagnosis of depression.  Denies feeling down and thoughts of harming self or others.  No acute distress.  Patient has dementia and requires assist with ADLs.  No acute distress.       Objective   Vital signs:  120/66,  94%    Physical Exam  Constitutional:       General: She is not in acute distress.  Eyes:      Extraocular Movements: Extraocular movements intact.   Cardiovascular:      Rate and Rhythm: Normal rate and regular rhythm.   Pulmonary:      Effort: Pulmonary effort is normal.      Breath sounds: Normal breath sounds.   Abdominal:      General: Bowel sounds are normal.      Palpations: Abdomen is soft.   Musculoskeletal:         General: Normal range of motion.      Cervical back: Normal range of motion and neck supple.      Right lower leg: No edema.      Left lower leg: No edema.   Neurological:      Mental Status: She is alert.   Psychiatric:         Mood and Affect: Mood normal.         Behavior: Behavior is cooperative.         Assessment/Plan   Problem List Items Addressed This Visit       Depression with anxiety     Mood stable  Monitor for changes in mod and behavior           Chronic diastolic congestive heart failure (CMS/HCC)     Stable  Lasix   Monitor weight  GARFIELD diet         Severe late onset Alzheimer's dementia without behavioral disturbance, psychotic disturbance, mood disturbance, or anxiety (CMS/HCC) - Primary     Secured unit for safety        Medications, treatments, and labs reviewed  Continue medications and treatments as listed in [facility]    Scribe Attestation  By signing my name below, I,  Shantanu Morenoibe   attest that this documentation has been prepared under the direction and in the presence of Hong Hope MD.    Provider Attestation - Scribe documentation  All medical record entries made by the Scribe were at my direction and personally dictated by me. I have reviewed the chart and agree that the record accurately reflects my personal performance of the history, physical exam, discussion and plan.  1. Severe late onset Alzheimer's dementia without behavioral disturbance, psychotic disturbance, mood disturbance, or anxiety (CMS/HCC)        2. Chronic diastolic congestive heart failure (CMS/HCC)        3. Depression with anxiety

## 2023-08-23 NOTE — LETTER
Patient: Juani Sinclair  : 1934    Encounter Date: 2023    PROGRESS NOTE    Subjective  Chief complaint: Juani Sinclair is a 89 y.o. female who is a long term care patient being seen and evaluated for weakness    HPI:  Patient with ALZ/Dementia mentation at baseline, continues to work in therapy due to weakness. She requires assistance for transfers and ADL's.       Objective  Vital signs: 123/74, 98%    Physical Exam  Constitutional:       General: She is not in acute distress.  Eyes:      Extraocular Movements: Extraocular movements intact.   Cardiovascular:      Rate and Rhythm: Normal rate and regular rhythm.   Pulmonary:      Effort: Pulmonary effort is normal.      Breath sounds: Normal breath sounds.   Abdominal:      General: Bowel sounds are normal.      Palpations: Abdomen is soft.   Musculoskeletal:      Cervical back: Neck supple.      Right lower leg: No edema.      Left lower leg: No edema.   Neurological:      Mental Status: She is alert.   Psychiatric:         Mood and Affect: Mood normal.         Behavior: Behavior is cooperative.         Assessment/Plan  Problem List Items Addressed This Visit       Severe late onset Alzheimer's dementia without behavioral disturbance, psychotic disturbance, mood disturbance, or anxiety (CMS/[facility])     Secured unit for safety  Mentation at baseline         Weakness - Primary       Continue therapy          Medications, treatments, and labs reviewed  Continue medications and treatments as listed in [facility]    Scribe Attestation  By signing my name below, I, Mihir Moreno   attest that this documentation has been prepared under the direction and in the presence of Hong Hope MD.    Provider Attestation - Scribe documentation  All medical record entries made by the Scribe were at my direction and personally dictated by me. I have reviewed the chart and agree that the record accurately reflects my personal performance of the history, physical  exam, discussion and plan.    1. Weakness        2. Severe late onset Alzheimer's dementia without behavioral disturbance, psychotic disturbance, mood disturbance, or anxiety (CMS/[facility])               Electronically Signed By: Hong Hope MD   8/28/23  3:16 PM

## 2023-08-25 NOTE — LETTER
Patient: Juani Sinclair  : 1934    Encounter Date: 2023    PROGRESS NOTE    Subjective  Chief complaint: Juani Sinclair is a 89 y.o. female who is a long term care patient being seen and evaluated for Depression with anxiety    HPI:   I was asked to see patient and evaluate continued use of Ativan.  Patient with history of depression with anxiety.  Mood has been stable.  Denies feeling low or thoughts of harming self or others.   Denies feeling  anxious nervous or afraid.   Denies shortness of breath orthopnea.  No acute distress.      Objective  Vital signs:  129/72, 98%    Physical Exam  Constitutional:       General: She is not in acute distress.  Eyes:      Extraocular Movements: Extraocular movements intact.   Cardiovascular:      Rate and Rhythm: Normal rate and regular rhythm.   Pulmonary:      Effort: Pulmonary effort is normal.      Breath sounds: Normal breath sounds.   Abdominal:      General: Bowel sounds are normal.      Palpations: Abdomen is soft.   Musculoskeletal:      Cervical back: Neck supple.      Right lower leg: No edema.      Left lower leg: No edema.   Neurological:      Mental Status: She is alert.   Psychiatric:         Mood and Affect: Mood normal.         Behavior: Behavior is cooperative.         Assessment/Plan  Problem List Items Addressed This Visit       Depression with anxiety     Mood stable  Monitor for changes in mood and behavior   continue Ativan   gradual dose reduction clinically contraindicated at this time.  Continued use is within current practice guidelines and medication reduction may cause return or worsening of symptoms.  Patient is stable and without side effects of therapy and these continue to be monitored.             Chronic diastolic congestive heart failure (CMS/HCC) - Primary     Stable  Lasix   Monitor weight  GARFIELD diet          Medications, treatments, and labs reviewed  Continue medications and treatments as listed in [facility]    Scribe  Attestation  By signing my name below, I, Mihir Moreno   attest that this documentation has been prepared under the direction and in the presence of Hong Hope MD.    Provider Attestation - Scribe documentation  All medical record entries made by the Scribe were at my direction and personally dictated by me. I have reviewed the chart and agree that the record accurately reflects my personal performance of the history, physical exam, discussion and plan.    1. Chronic diastolic congestive heart failure (CMS/HCC)        2. Depression with anxiety               Electronically Signed By: Hong Hope MD   8/25/23  1:26 PM

## 2023-08-25 NOTE — PROGRESS NOTES
PROGRESS NOTE    Subjective   Chief complaint: Juani Sinclair is a 89 y.o. female who is a long term care patient being seen and evaluated for Depression with anxiety    HPI:   I was asked to see patient and evaluate continued use of Ativan.  Patient with history of depression with anxiety.  Mood has been stable.  Denies feeling low or thoughts of harming self or others.   Denies feeling  anxious nervous or afraid.   Denies shortness of breath orthopnea.  No acute distress.      Objective   Vital signs:  129/72, 98%    Physical Exam  Constitutional:       General: She is not in acute distress.  Eyes:      Extraocular Movements: Extraocular movements intact.   Cardiovascular:      Rate and Rhythm: Normal rate and regular rhythm.   Pulmonary:      Effort: Pulmonary effort is normal.      Breath sounds: Normal breath sounds.   Abdominal:      General: Bowel sounds are normal.      Palpations: Abdomen is soft.   Musculoskeletal:      Cervical back: Neck supple.      Right lower leg: No edema.      Left lower leg: No edema.   Neurological:      Mental Status: She is alert.   Psychiatric:         Mood and Affect: Mood normal.         Behavior: Behavior is cooperative.         Assessment/Plan   Problem List Items Addressed This Visit       Depression with anxiety     Mood stable  Monitor for changes in mood and behavior   continue Ativan   gradual dose reduction clinically contraindicated at this time.  Continued use is within current practice guidelines and medication reduction may cause return or worsening of symptoms.  Patient is stable and without side effects of therapy and these continue to be monitored.             Chronic diastolic congestive heart failure (CMS/HCC) - Primary     Stable  Lasix   Monitor weight  GARFIELD diet          Medications, treatments, and labs reviewed  Continue medications and treatments as listed in [facility]    Scribe Attestation  By signing my name below, I, Rocio Ellis, Scribe   attest that  this documentation has been prepared under the direction and in the presence of Hong Hope MD.    Provider Attestation - Scribe documentation  All medical record entries made by the Scribe were at my direction and personally dictated by me. I have reviewed the chart and agree that the record accurately reflects my personal performance of the history, physical exam, discussion and plan.    1. Chronic diastolic congestive heart failure (CMS/HCC)        2. Depression with anxiety

## 2023-08-25 NOTE — ASSESSMENT & PLAN NOTE
Mood stable  Monitor for changes in mood and behavior   continue Ativan   gradual dose reduction clinically contraindicated at this time.  Continued use is within current practice guidelines and medication reduction may cause return or worsening of symptoms.  Patient is stable and without side effects of therapy and these continue to be monitored.

## 2023-08-28 NOTE — PROGRESS NOTES
PROGRESS NOTE    Subjective   Chief complaint: Juani Sinclair is a 89 y.o. female who is a long term care patient being seen and evaluated for weakness    HPI:  Patient with ALZ/Dementia mentation at baseline, continues to work in therapy due to weakness. She requires assistance for transfers and ADL's.       Objective   Vital signs: 123/74, 98%    Physical Exam  Constitutional:       General: She is not in acute distress.  Eyes:      Extraocular Movements: Extraocular movements intact.   Cardiovascular:      Rate and Rhythm: Normal rate and regular rhythm.   Pulmonary:      Effort: Pulmonary effort is normal.      Breath sounds: Normal breath sounds.   Abdominal:      General: Bowel sounds are normal.      Palpations: Abdomen is soft.   Musculoskeletal:      Cervical back: Neck supple.      Right lower leg: No edema.      Left lower leg: No edema.   Neurological:      Mental Status: She is alert.   Psychiatric:         Mood and Affect: Mood normal.         Behavior: Behavior is cooperative.         Assessment/Plan   Problem List Items Addressed This Visit       Severe late onset Alzheimer's dementia without behavioral disturbance, psychotic disturbance, mood disturbance, or anxiety (CMS/[facility])     Secured unit for safety  Mentation at baseline         Weakness - Primary       Continue therapy          Medications, treatments, and labs reviewed  Continue medications and treatments as listed in [facility]    Scribe Attestation  By signing my name below, IRocio Scribwander   attest that this documentation has been prepared under the direction and in the presence of Hong Hope MD.    Provider Attestation - Scribe documentation  All medical record entries made by the Scribe were at my direction and personally dictated by me. I have reviewed the chart and agree that the record accurately reflects my personal performance of the history, physical exam, discussion and plan.    1. Weakness        2. Severe late onset  Alzheimer's dementia without behavioral disturbance, psychotic disturbance, mood disturbance, or anxiety (CMS/HCC)

## 2023-08-28 NOTE — ASSESSMENT & PLAN NOTE
Secured unit for safety  Mentation at baseline   Karlie Pierce MD   [facility]  Teams preferred  Pager: [phone]    Patient is a 56y old  Female who presents with a chief complaint of Acute encephalopathy, Generalized weakness (15 May 2023 15:33)      INTERVAL HPI/OVERNIGHT EVENTS: no issues overnight. saw Pt this morning after LP. states procedure went very well. denies any complaints.     MEDICATIONS  (STANDING):  amLODIPine   Tablet 10 milliGRAM(s) Oral daily  ARIPiprazole 10 milliGRAM(s) Oral daily  metoprolol succinate ER 25 milliGRAM(s) Oral daily  multivitamin 1 Tablet(s) Oral daily  traZODone 50 milliGRAM(s) Oral at bedtime    MEDICATIONS  (PRN):  acetaminophen     Tablet .. 650 milliGRAM(s) Oral every 6 hours PRN Temp greater or equal to 38C (100.4F), Mild Pain (1 - 3)      Allergies    Shrimp (Hives)  No Known Drug Allergies    Intolerances        REVIEW OF SYSTEMS:  Please see interval HPI:    Vital Signs Last 24 Hrs  T(C): 36.7 (16 May 2023 05:17), Max: 37.2 (15 May 2023 20:13)  T(F): 98.1 (16 May 2023 05:17), Max: 98.9 (15 May 2023 20:13)  HR: 81 (16 May 2023 05:17) (81 - 86)  BP: 123/81 (16 May 2023 05:17) (123/81 - 135/80)  BP(mean): --  RR: 19 (16 May 2023 05:17) (18 - 19)  SpO2: 100% (16 May 2023 05:17) (98% - 100%)    Parameters below as of 16 May 2023 05:17  Patient On (Oxygen Delivery Method): room air      I&O's Detail        PHYSICAL EXAM:  Vital Signs Last 24 Hrs  T(C): 36.7 (16 May 2023 05:17), Max: 37.2 (15 May 2023 20:13)  T(F): 98.1 (16 May 2023 05:17), Max: 98.9 (15 May 2023 20:13)  HR: 81 (16 May 2023 05:17) (81 - 86)  BP: 123/81 (16 May 2023 05:17) (123/81 - 135/80)  BP(mean): --  RR: 19 (16 May 2023 05:17) (18 - 19)  SpO2: 100% (16 May 2023 05:17) (98% - 100%)    Parameters below as of 16 May 2023 05:17  Patient On (Oxygen Delivery Method): room air      CONSTITUTIONAL: NAD,   ENMT: Moist oral mucosa,   RESPIRATORY: Normal respiratory effort; lungs are clear to auscultation bilaterally  CARDIOVASCULAR: Regular rate and rhythm, normal S1 and S2, no murmur/rub/gallop; No lower extremity edema;   ABDOMEN: Nontender to palpation, normoactive bowel sounds, no rebound/guarding; No hepatosplenomegaly  EXT: no edema b/l  NEUROLOGY: alert, following commands, +cogwheel rigidity A&O x 3  SKIN: No rashes; no palpable lesions      LABS:                        12.1   9.61  )-----------( 323      ( 16 May 2023 06:08 )             33.4     16 May 2023 06:08    140    |  105    |  15     ----------------------------<  92     4.3     |  23     |  0.84     Ca    9.6        16 May 2023 06:08  Phos  4.0       16 May 2023 06:08  Mg     2.30      16 May 2023 06:08        CAPILLARY BLOOD GLUCOSE        BLOOD CULTURE    RADIOLOGY & ADDITIONAL TESTS:    Imaging Personally Reviewed:  [ ] YES     Consultant(s) Notes Reviewed:      Care Discussed with Consultants/Other Providers:

## 2023-09-13 NOTE — LETTER
Patient: Juani Sinclair  : 1934    Encounter Date: 2023    PROGRESS NOTE    Subjective  Chief complaint: Juani Sinclair is a 89 y.o. female who is a long term care patient being seen and evaluated for monthly general medical care and follow-up.    HPI:   patient presents for general medical care and f/u.  Patient seen and examined at bedside.  No issues per nursing.  Patient has no acute complaints.   denies sob, orthopnea, weight gain.  Patient with diagnosis of depression.  Denies feeling down and thoughts of harming self or others.  No acute distress.  Patient has dementia and requires assist with ADLs.  denies chest pain or headache.  No acute distress.       Objective  Vital signs:  126/66,  94%    Physical Exam  Constitutional:       General: She is not in acute distress.  Eyes:      Extraocular Movements: Extraocular movements intact.   Cardiovascular:      Rate and Rhythm: Normal rate and regular rhythm.   Pulmonary:      Effort: Pulmonary effort is normal.      Breath sounds: Normal breath sounds.   Abdominal:      General: Bowel sounds are normal.      Palpations: Abdomen is soft.   Musculoskeletal:         General: Normal range of motion.      Cervical back: Normal range of motion and neck supple.      Right lower leg: No edema.      Left lower leg: No edema.   Neurological:      Mental Status: She is alert.   Psychiatric:         Mood and Affect: Mood normal.         Behavior: Behavior is cooperative.         Assessment/Plan  Problem List Items Addressed This Visit       Hypertension, essential     BP at goal  Continue antihypertensives  Monitor BP         Depression with anxiety     Mood stable  Monitor for changes in mood and behavior   continue Ativan              Chronic diastolic congestive heart failure (CMS/HCC)     Stable  Lasix   Monitor weight  GARFIELD diet         Severe late onset Alzheimer's dementia without behavioral disturbance, psychotic disturbance, mood disturbance, or  anxiety (CMS/[facility]) - Primary     Secured unit for safety  Mentation at baseline        Medications, treatments, and labs reviewed  Continue medications and treatments as listed in [facility]    Scribe Attestation  By signing my name below, I, Mihir Moreno   attest that this documentation has been prepared under the direction and in the presence of Hong Hope MD.    Provider Attestation - Scribe documentation  All medical record entries made by the Scribe were at my direction and personally dictated by me. I have reviewed the chart and agree that the record accurately reflects my personal performance of the history, physical exam, discussion and plan.  1. Severe late onset Alzheimer's dementia without behavioral disturbance, psychotic disturbance, mood disturbance, or anxiety (CMS/HCC)        2. Hypertension, essential        3. Depression with anxiety        4. Chronic diastolic congestive heart failure (CMS/[facility])              Electronically Signed By: Hong Hope MD   9/13/23  4:22 PM

## 2023-09-13 NOTE — PROGRESS NOTES
PROGRESS NOTE    Subjective   Chief complaint: Juani Sinclair is a 89 y.o. female who is a long term care patient being seen and evaluated for monthly general medical care and follow-up.    HPI:   patient presents for general medical care and f/u.  Patient seen and examined at bedside.  No issues per nursing.  Patient has no acute complaints.   denies sob, orthopnea, weight gain.  Patient with diagnosis of depression.  Denies feeling down and thoughts of harming self or others.  No acute distress.  Patient has dementia and requires assist with ADLs.  denies chest pain or headache.  No acute distress.       Objective   Vital signs:  126/66,  94%    Physical Exam  Constitutional:       General: She is not in acute distress.  Eyes:      Extraocular Movements: Extraocular movements intact.   Cardiovascular:      Rate and Rhythm: Normal rate and regular rhythm.   Pulmonary:      Effort: Pulmonary effort is normal.      Breath sounds: Normal breath sounds.   Abdominal:      General: Bowel sounds are normal.      Palpations: Abdomen is soft.   Musculoskeletal:         General: Normal range of motion.      Cervical back: Normal range of motion and neck supple.      Right lower leg: No edema.      Left lower leg: No edema.   Neurological:      Mental Status: She is alert.   Psychiatric:         Mood and Affect: Mood normal.         Behavior: Behavior is cooperative.         Assessment/Plan   Problem List Items Addressed This Visit       Hypertension, essential     BP at goal  Continue antihypertensives  Monitor BP         Depression with anxiety     Mood stable  Monitor for changes in mood and behavior   continue Ativan              Chronic diastolic congestive heart failure (CMS/HCC)     Stable  Lasix   Monitor weight  GARFIELD diet         Severe late onset Alzheimer's dementia without behavioral disturbance, psychotic disturbance, mood disturbance, or anxiety (CMS/HCC) - Primary     Secured unit for safety  Mentation at  baseline        Medications, treatments, and labs reviewed  Continue medications and treatments as listed in [facility]    Scribe Attestation  By signing my name below, I, Mihir Moreno   attest that this documentation has been prepared under the direction and in the presence of Hong Hope MD.    Provider Attestation - Scribe documentation  All medical record entries made by the Scribe were at my direction and personally dictated by me. I have reviewed the chart and agree that the record accurately reflects my personal performance of the history, physical exam, discussion and plan.  1. Severe late onset Alzheimer's dementia without behavioral disturbance, psychotic disturbance, mood disturbance, or anxiety (CMS/HCC)        2. Hypertension, essential        3. Depression with anxiety        4. Chronic diastolic congestive heart failure (CMS/HCC)

## 2023-09-26 NOTE — LETTER
Patient: Juani Sinclair  : 1934    Encounter Date: 2023    PROGRESS NOTE    Subjective  Chief complaint: Juani Sinclair is a 89 y.o. female who is a long term care patient being seen and evaluated for cold feet.    HPI:  HPI   Nurse reporting patient with cold feet.  Patient denies foot pain. Denies n/v/f/c.  No further concerns reported.    Objective  Vital signs:   18, 130/86, 97.6, 84, 96%  Physical Exam  Constitutional:       General: She is not in acute distress.  Cardiovascular:      Rate and Rhythm: Normal rate and regular rhythm.   Pulmonary:      Effort: Pulmonary effort is normal.   Musculoskeletal:      Cervical back: Neck supple.   Feet:      Comments: Bilateral feet cold and reddened  Left 2nd toe with small scab  Good pulses B/L  Psychiatric:         Behavior: Behavior is cooperative.         Assessment/Plan  Problem List Items Addressed This Visit       Cold feet - Primary     ?PAD  Good pulses  No ischemia  No indication for intervention  We will monitor         Hypertension, essential     BP at goal  Continue antihypertensives  Monitor BP         Scab     Small scab to the left second toe  Paint with Betadine  Consult wound after         Severe late onset Alzheimer's dementia without behavioral disturbance, psychotic disturbance, mood disturbance, or anxiety (CMS/[facility])     Secured unit for safety          Medications, treatments, and labs reviewed  Continue medications and treatments as listed in EMR    Scribe Attestation  IIsidra Scribe   attest that this documentation has been prepared under the direction and in the presence of NAVI Anderson-CNP    Provider Attestation - Scribe documentation  All medical record entries made by the Scribe were at my direction and personally dictated by me. I have reviewed the chart and agree that the record accurately reflects my personal performance of the history, physical exam, discussion and plan.   Mandy Small,  APRN-CNP        Electronically Signed By: NIKKO Anderson   10/7/23  8:34 PM

## 2023-09-29 PROBLEM — M25.511 ACUTE PAIN OF BOTH SHOULDERS: Status: ACTIVE | Noted: 2023-01-01

## 2023-09-29 PROBLEM — M25.512 ACUTE PAIN OF BOTH SHOULDERS: Status: ACTIVE | Noted: 2023-01-01

## 2023-09-29 NOTE — PROGRESS NOTES
PROGRESS NOTE    Subjective   Chief complaint: Jauni Sinclair is a 89 y.o. female who is a long term care patient being seen and evaluated for   Patient    HPI:   I was asked to see patient .with Dementia and evaluate her for complaints of shoulder pain.   She denies trauma or fall.  No  swelling, redness or bruising noted  on exam. .  Denies chest pain or headache.  Denies shortness of breath orthopnea.   Mentation at baseline No acute distress.      Objective   Vital signs: 126/72, 98%    Physical Exam  Constitutional:       General: She is not in acute distress.  Eyes:      Extraocular Movements: Extraocular movements intact.   Cardiovascular:      Rate and Rhythm: Normal rate and regular rhythm.   Pulmonary:      Effort: Pulmonary effort is normal.      Breath sounds: Normal breath sounds.   Abdominal:      General: Bowel sounds are normal.      Palpations: Abdomen is soft.   Musculoskeletal:      Cervical back: Neck supple.      Right lower leg: No edema.      Left lower leg: No edema.   Skin:     Comments:  no redness, bruising or edema noted to shoulder   Neurological:      Mental Status: She is alert.   Psychiatric:         Mood and Affect: Mood normal.         Behavior: Behavior is cooperative.         Assessment/Plan   Problem List Items Addressed This Visit       Hypertension, essential     BP at goal  Continue antihypertensives  Monitor BP         Chronic diastolic congestive heart failure (CMS/HCC)     Stable  Lasix   Monitor weight  GARFIELD diet         Severe late onset Alzheimer's dementia without behavioral disturbance, psychotic disturbance, mood disturbance, or anxiety (CMS/HCC) - Primary     Secured unit for safety  Mentation at baseline         Acute pain of both shoulders      continue Tylenol          Medications, treatments, and labs reviewed  Continue medications and treatments as listed in [facility]    Scribe Attestation  By signing my name below, IRocio, Scribe   attest that this  documentation has been prepared under the direction and in the presence of Hong Hope MD.    Provider Attestation - Scribe documentation  All medical record entries made by the Scribe were at my direction and personally dictated by me. I have reviewed the chart and agree that the record accurately reflects my personal performance of the history, physical exam, discussion and plan.    1. Severe late onset Alzheimer's dementia without behavioral disturbance, psychotic disturbance, mood disturbance, or anxiety (CMS/HCC)        2. Hypertension, essential        3. Chronic diastolic congestive heart failure (CMS/HCC)        4. Acute pain of both shoulders

## 2023-09-29 NOTE — LETTER
Patient: Juani Sinclair  : 1934    Encounter Date: 2023    PROGRESS NOTE    Subjective  Chief complaint: Juani Sinclair is a 89 y.o. female who is a long term care patient being seen and evaluated for   Patient    HPI:   I was asked to see patient .with Dementia and evaluate her for complaints of shoulder pain.   She denies trauma or fall.  No  swelling, redness or bruising noted  on exam. .  Denies chest pain or headache.  Denies shortness of breath orthopnea.   Mentation at baseline No acute distress.      Objective  Vital signs: 126/72, 98%    Physical Exam  Constitutional:       General: She is not in acute distress.  Eyes:      Extraocular Movements: Extraocular movements intact.   Cardiovascular:      Rate and Rhythm: Normal rate and regular rhythm.   Pulmonary:      Effort: Pulmonary effort is normal.      Breath sounds: Normal breath sounds.   Abdominal:      General: Bowel sounds are normal.      Palpations: Abdomen is soft.   Musculoskeletal:      Cervical back: Neck supple.      Right lower leg: No edema.      Left lower leg: No edema.   Skin:     Comments:  no redness, bruising or edema noted to shoulder   Neurological:      Mental Status: She is alert.   Psychiatric:         Mood and Affect: Mood normal.         Behavior: Behavior is cooperative.         Assessment/Plan  Problem List Items Addressed This Visit       Hypertension, essential     BP at goal  Continue antihypertensives  Monitor BP         Chronic diastolic congestive heart failure (CMS/HCC)     Stable  Lasix   Monitor weight  GARFIELD diet         Severe late onset Alzheimer's dementia without behavioral disturbance, psychotic disturbance, mood disturbance, or anxiety (CMS/HCC) - Primary     Secured unit for safety  Mentation at baseline         Acute pain of both shoulders      continue Tylenol          Medications, treatments, and labs reviewed  Continue medications and treatments as listed in [facility]    Scribe Attestation  By  signing my name below, I, Mihir Moreno   attest that this documentation has been prepared under the direction and in the presence of Hong Hope MD.    Provider Attestation - Scribe documentation  All medical record entries made by the Scribe were at my direction and personally dictated by me. I have reviewed the chart and agree that the record accurately reflects my personal performance of the history, physical exam, discussion and plan.    1. Severe late onset Alzheimer's dementia without behavioral disturbance, psychotic disturbance, mood disturbance, or anxiety (CMS/HCC)        2. Hypertension, essential        3. Chronic diastolic congestive heart failure (CMS/HCC)        4. Acute pain of both shoulders               Electronically Signed By: Hong Hope MD   9/29/23 11:58 AM

## 2023-09-29 NOTE — PROGRESS NOTES
PROGRESS NOTE    Subjective   Chief complaint: Juani Sinclair is a 89 y.o. female who is a long term care patient being seen and evaluated for cold feet.    HPI:  HPI   Nurse reporting patient with cold feet.  Patient denies foot pain. Denies n/v/f/c.  No further concerns reported.    Objective   Vital signs:   18, 130/86, 97.6, 84, 96%  Physical Exam  Constitutional:       General: She is not in acute distress.  Cardiovascular:      Rate and Rhythm: Normal rate and regular rhythm.   Pulmonary:      Effort: Pulmonary effort is normal.   Musculoskeletal:      Cervical back: Neck supple.   Feet:      Comments: Bilateral feet cold and reddened  Left 2nd toe with small scab  Good pulses B/L  Psychiatric:         Behavior: Behavior is cooperative.         Assessment/Plan   Problem List Items Addressed This Visit       Cold feet - Primary     ?PAD  Good pulses  No ischemia  No indication for intervention  We will monitor         Hypertension, essential     BP at goal  Continue antihypertensives  Monitor BP         Scab     Small scab to the left second toe  Paint with Betadine  Consult wound after         Severe late onset Alzheimer's dementia without behavioral disturbance, psychotic disturbance, mood disturbance, or anxiety (CMS/[facility])     Secured unit for safety          Medications, treatments, and labs reviewed  Continue medications and treatments as listed in EMR    Scribe Attestation  Isidra SMALLS Scribe   attest that this documentation has been prepared under the direction and in the presence of NIKKO Anderson    Provider Attestation - Scribe documentation  All medical record entries made by the Scribe were at my direction and personally dictated by me. I have reviewed the chart and agree that the record accurately reflects my personal performance of the history, physical exam, discussion and plan.   NIKKO Anderson

## 2023-10-06 NOTE — LETTER
Patient: Juani Sinclair  : 1934    Encounter Date: 10/06/2023    PROGRESS NOTE    Subjective  Chief complaint: Juani Sinclair is a 89 y.o. female who is a long term care patient being seen and evaluated for weight gain    HPI:   nurse called and reported patient with .  Dementia, mentation at baseline, had noted weight gain and edema.  Patient does get Lasix 20 mg daily.  BMP obtained and unremarkable.  .  No other concerns at this time.  No acute distress.      Objective  Vital signs:  124/72, 98%    Physical Exam  Constitutional:       General: She is not in acute distress.  Eyes:      Extraocular Movements: Extraocular movements intact.   Cardiovascular:      Rate and Rhythm: Normal rate and regular rhythm.   Pulmonary:      Effort: Pulmonary effort is normal.      Breath sounds: Normal breath sounds.   Abdominal:      General: Bowel sounds are normal.      Palpations: Abdomen is soft.   Musculoskeletal:      Cervical back: Neck supple.      Right lower leg: Edema present.      Left lower leg: Edema present.   Neurological:      Mental Status: She is alert.   Psychiatric:         Mood and Affect: Mood normal.         Behavior: Behavior is cooperative.         Assessment/Plan  Problem List Items Addressed This Visit       Severe late onset Alzheimer's dementia without behavioral disturbance, psychotic disturbance, mood disturbance, or anxiety (CMS/[facility])     Secured unit for safety  Mentation at baseline         Weight gain with edema - Primary       Double Lasix x7 days and repeat BMP in 1 week          Medications, treatments, and labs reviewed  Continue medications and treatments as listed in [facility]    Scribe Attestation  By signing my name below, Rocio SMALLS Scribe   attest that this documentation has been prepared under the direction and in the presence of Hong Hope MD.    Provider Attestation - Scribe documentation  All medical record entries made by the Scribe were at my direction and  personally dictated by me. I have reviewed the chart and agree that the record accurately reflects my personal performance of the history, physical exam, discussion and plan.    1. Weight gain with edema        2. Severe late onset Alzheimer's dementia without behavioral disturbance, psychotic disturbance, mood disturbance, or anxiety (CMS/[facility])               Electronically Signed By: Hong Hope MD   10/6/23  6:00 PM

## 2023-10-06 NOTE — PROGRESS NOTES
PROGRESS NOTE    Subjective   Chief complaint: Juani Sinclair is a 89 y.o. female who is a long term care patient being seen and evaluated for weight gain    HPI:   nurse called and reported patient with .  Dementia, mentation at baseline, had noted weight gain and edema.  Patient does get Lasix 20 mg daily.  BMP obtained and unremarkable.  .  No other concerns at this time.  No acute distress.      Objective   Vital signs:  124/72, 98%    Physical Exam  Constitutional:       General: She is not in acute distress.  Eyes:      Extraocular Movements: Extraocular movements intact.   Cardiovascular:      Rate and Rhythm: Normal rate and regular rhythm.   Pulmonary:      Effort: Pulmonary effort is normal.      Breath sounds: Normal breath sounds.   Abdominal:      General: Bowel sounds are normal.      Palpations: Abdomen is soft.   Musculoskeletal:      Cervical back: Neck supple.      Right lower leg: Edema present.      Left lower leg: Edema present.   Neurological:      Mental Status: She is alert.   Psychiatric:         Mood and Affect: Mood normal.         Behavior: Behavior is cooperative.         Assessment/Plan   Problem List Items Addressed This Visit       Severe late onset Alzheimer's dementia without behavioral disturbance, psychotic disturbance, mood disturbance, or anxiety (CMS/[facility])     Secured unit for safety  Mentation at baseline         Weight gain with edema - Primary       Double Lasix x7 days and repeat BMP in 1 week          Medications, treatments, and labs reviewed  Continue medications and treatments as listed in [facility]    Scribe Attestation  By signing my name below, I, Mihir Moreno   attest that this documentation has been prepared under the direction and in the presence of Hong Hope MD.    Provider Attestation - Scribe documentation  All medical record entries made by the Scribe were at my direction and personally dictated by me. I have reviewed the chart and agree that the  record accurately reflects my personal performance of the history, physical exam, discussion and plan.    1. Weight gain with edema        2. Severe late onset Alzheimer's dementia without behavioral disturbance, psychotic disturbance, mood disturbance, or anxiety (CMS/[facility])

## 2023-10-07 PROBLEM — R23.4 SCAB: Status: ACTIVE | Noted: 2023-01-01

## 2023-10-07 PROBLEM — R20.9 COLD FEET: Status: ACTIVE | Noted: 2023-01-01

## 2023-10-11 NOTE — LETTER
Patient: Juani Sinclair  : 1934    Encounter Date: 10/11/2023    PROGRESS NOTE    Subjective  Chief complaint: Juani Sinclair is a 89 y.o. female who is a long term care patient being seen and evaluated for monthly general medical care and follow-up.    HPI:   patient presents for general medical care and f/u.  Patient seen and examined at bedside.  No issues per nursing.  Patient has no acute complaints.   denies sob, orthopnea, weight gain.  Patient with diagnosis of depression.  Denies feeling down and thoughts of harming self or others.  No acute distress.  Patient has dementia and requires assist with ADLs. Patient had weight gain with edema and continue son double lasix will have repeat BMP when complete. Edema improving. No acute distress.       Objective  Vital signs:  120/66,  94%    Physical Exam  Constitutional:       General: She is not in acute distress.  Eyes:      Extraocular Movements: Extraocular movements intact.   Cardiovascular:      Rate and Rhythm: Normal rate and regular rhythm.   Pulmonary:      Effort: Pulmonary effort is normal.      Breath sounds: Normal breath sounds.   Abdominal:      General: Bowel sounds are normal.      Palpations: Abdomen is soft.   Musculoskeletal:         General: Normal range of motion.      Cervical back: Normal range of motion and neck supple.      Right lower leg: Edema present.      Left lower leg: Edema present.   Neurological:      Mental Status: She is alert.   Psychiatric:         Mood and Affect: Mood normal.         Behavior: Behavior is cooperative.         Assessment/Plan  Problem List Items Addressed This Visit       Hypertension, essential     BP at goal  Continue antihypertensives  Monitor BP         Depression with anxiety     Mood stable  Monitor for changes in mood and behavior   continue Ativan              Chronic diastolic congestive heart failure (CMS/HCC)     Stable  Lasix   Monitor weight  GARFIELD diet         Severe late onset  Alzheimer's dementia without behavioral disturbance, psychotic disturbance, mood disturbance, or anxiety (CMS/HCC) - Primary     Secured unit for safety         Weight gain with edema       Double Lasix x7 days and repeat BMP in 1 week        Medications, treatments, and labs reviewed  Continue medications and treatments as listed in [facility]    Scribe Attestation  By signing my name below, I, Rociotoni Lingh, Scribe   attest that this documentation has been prepared under the direction and in the presence of Hong Hope MD.    Provider Attestation - Scribe documentation  All medical record entries made by the Scribe were at my direction and personally dictated by me. I have reviewed the chart and agree that the record accurately reflects my personal performance of the history, physical exam, discussion and plan.  1. Severe late onset Alzheimer's dementia without behavioral disturbance, psychotic disturbance, mood disturbance, or anxiety (CMS/HCC)        2. Hypertension, essential        3. Depression with anxiety        4. Chronic diastolic congestive heart failure (CMS/HCC)        5. Weight gain with edema              Electronically Signed By: Hong Hope MD   10/11/23  4:33 PM

## 2023-10-11 NOTE — PROGRESS NOTES
PROGRESS NOTE    Subjective   Chief complaint: Juani Sinclair is a 89 y.o. female who is a long term care patient being seen and evaluated for monthly general medical care and follow-up.    HPI:   patient presents for general medical care and f/u.  Patient seen and examined at bedside.  No issues per nursing.  Patient has no acute complaints.   denies sob, orthopnea, weight gain.  Patient with diagnosis of depression.  Denies feeling down and thoughts of harming self or others.  No acute distress.  Patient has dementia and requires assist with ADLs. Patient had weight gain with edema and continue son double lasix will have repeat BMP when complete. Edema improving. No acute distress.       Objective   Vital signs:  120/66,  94%    Physical Exam  Constitutional:       General: She is not in acute distress.  Eyes:      Extraocular Movements: Extraocular movements intact.   Cardiovascular:      Rate and Rhythm: Normal rate and regular rhythm.   Pulmonary:      Effort: Pulmonary effort is normal.      Breath sounds: Normal breath sounds.   Abdominal:      General: Bowel sounds are normal.      Palpations: Abdomen is soft.   Musculoskeletal:         General: Normal range of motion.      Cervical back: Normal range of motion and neck supple.      Right lower leg: Edema present.      Left lower leg: Edema present.   Neurological:      Mental Status: She is alert.   Psychiatric:         Mood and Affect: Mood normal.         Behavior: Behavior is cooperative.         Assessment/Plan   Problem List Items Addressed This Visit       Hypertension, essential     BP at goal  Continue antihypertensives  Monitor BP         Depression with anxiety     Mood stable  Monitor for changes in mood and behavior   continue Ativan              Chronic diastolic congestive heart failure (CMS/HCC)     Stable  Lasix   Monitor weight  GARFIELD diet         Severe late onset Alzheimer's dementia without behavioral disturbance, psychotic disturbance,  mood disturbance, or anxiety (CMS/HCC) - Primary     Secured unit for safety         Weight gain with edema       Double Lasix x7 days and repeat BMP in 1 week        Medications, treatments, and labs reviewed  Continue medications and treatments as listed in [facility]    Scribe Attestation  By signing my name below, I, Mihir Moreno   attest that this documentation has been prepared under the direction and in the presence of Hong Hope MD.    Provider Attestation - Scribe documentation  All medical record entries made by the Scribe were at my direction and personally dictated by me. I have reviewed the chart and agree that the record accurately reflects my personal performance of the history, physical exam, discussion and plan.  1. Severe late onset Alzheimer's dementia without behavioral disturbance, psychotic disturbance, mood disturbance, or anxiety (CMS/HCC)        2. Hypertension, essential        3. Depression with anxiety        4. Chronic diastolic congestive heart failure (CMS/HCC)        5. Weight gain with edema

## 2023-10-19 PROBLEM — R63.5 WEIGHT GAIN WITH EDEMA: Status: RESOLVED | Noted: 2023-01-01 | Resolved: 2023-01-01

## 2023-10-19 PROBLEM — F02.C4 SEVERE LATE ONSET ALZHEIMER'S DEMENTIA WITH ANXIETY (MULTI): Status: ACTIVE | Noted: 2023-01-01

## 2023-10-19 PROBLEM — L98.9 SKIN ABNORMALITY: Status: RESOLVED | Noted: 2023-01-01 | Resolved: 2023-01-01

## 2023-10-19 PROBLEM — R63.0 POOR APPETITE: Status: ACTIVE | Noted: 2023-01-01

## 2023-10-19 PROBLEM — R40.0 DAYTIME SLEEPINESS: Status: ACTIVE | Noted: 2023-01-01

## 2023-10-19 PROBLEM — R60.9 WEIGHT GAIN WITH EDEMA: Status: RESOLVED | Noted: 2023-01-01 | Resolved: 2023-01-01

## 2023-10-19 PROBLEM — U07.1 COVID: Status: RESOLVED | Noted: 2023-01-01 | Resolved: 2023-01-01

## 2023-10-19 PROBLEM — R20.9 COLD FEET: Status: RESOLVED | Noted: 2023-01-01 | Resolved: 2023-01-01

## 2023-10-19 PROBLEM — R23.4 SCAB: Status: RESOLVED | Noted: 2023-01-01 | Resolved: 2023-01-01

## 2023-10-19 NOTE — ASSESSMENT & PLAN NOTE
Ativan as needed for 14 days.  Discontinue scheduled Ativan  Secure unit for safety  Monitor behaviors

## 2023-10-19 NOTE — LETTER
Patient: Juani Sinclair  : 1934    Encounter Date: 10/19/2023    PROGRESS NOTE    Subjective  Chief complaint: Juani Sinclair is a 89 y.o. female who is a long term care patient being seen and evaluated for multiple concerns    HPI:  HPI  Asked to see patient to evaluate for multiple concerns.  Nurse reporting patient has not been herself lately.  She has poor appetite but weights have been stable.  However she does have some lower extremity edema and is on Lasix 20 mg daily.  Nurse reports patient has been lethargic as well.  Patient seen and examined at bedside in no apparent distress.  Sleepy but wakes to verbal stimuli.  Recent labs unremarkable.    Objective  Vital signs: 107/65, 18, 97.7, 88, 94%    Physical Exam  Constitutional:       General: She is not in acute distress.     Comments: Sleepy   Eyes:      Extraocular Movements: Extraocular movements intact.   Cardiovascular:      Rate and Rhythm: Normal rate and regular rhythm.   Pulmonary:      Effort: Pulmonary effort is normal.      Breath sounds: Normal breath sounds.   Musculoskeletal:      Cervical back: Neck supple.      Right lower leg: No edema.      Left lower leg: No edema.   Psychiatric:         Behavior: Behavior is cooperative.         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  Discontinue scheduled Ativan  Secure unit for safety  Monitor behaviors          Medications, treatments, and labs reviewed  Continue medications and treatments  as listed in EMR    NIKKO Anderson      Electronically Signed By: NIKKO Anderson   10/19/23  5:55 PM

## 2023-10-19 NOTE — PROGRESS NOTES
PROGRESS NOTE    Subjective   Chief complaint: Juani Sinclair is a 89 y.o. female who is a long term care patient being seen and evaluated for multiple concerns    HPI:  HPI  Asked to see patient to evaluate for multiple concerns.  Nurse reporting patient has not been herself lately.  She has poor appetite but weights have been stable.  However she does have some lower extremity edema and is on Lasix 20 mg daily.  Nurse reports patient has been lethargic as well.  Patient seen and examined at bedside in no apparent distress.  Sleepy but wakes to verbal stimuli.  Recent labs unremarkable.    Objective   Vital signs: 107/65, 18, 97.7, 88, 94%    Physical Exam  Constitutional:       General: She is not in acute distress.     Comments: Sleepy   Eyes:      Extraocular Movements: Extraocular movements intact.   Cardiovascular:      Rate and Rhythm: Normal rate and regular rhythm.   Pulmonary:      Effort: Pulmonary effort is normal.      Breath sounds: Normal breath sounds.   Musculoskeletal:      Cervical back: Neck supple.      Right lower leg: No edema.      Left lower leg: No edema.   Psychiatric:         Behavior: Behavior is cooperative.         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  Discontinue scheduled Ativan  Secure unit for safety  Monitor behaviors          Medications, treatments, and labs reviewed  Continue medications and treatments as listed in EMR    Mandy Small, NAVI-CNP

## 2023-10-25 PROBLEM — R62.7 FTT (FAILURE TO THRIVE) IN ADULT: Status: ACTIVE | Noted: 2023-01-01

## 2023-10-25 NOTE — PROGRESS NOTES
PROGRESS NOTE    Subjective   Chief complaint: Juani Sinclair is a 89 y.o. female who is a long term care patient being seen and evaluated for gen medical care and decline    HPI:  Patient has been having decline in condition her appetite has been poor and she has been having increased daytime sleepiness. She presented with SOB and increased edema. Family aware of her decline and refused hospitalization. They have decided to admit to hospice. Patient is presently comfortable and does not have any pain. Denies chest pain or headache.       Objective   Vital signs: 113/65, 98%    Physical Exam  Constitutional:       General: She is not in acute distress.  Eyes:      Extraocular Movements: Extraocular movements intact.   Cardiovascular:      Rate and Rhythm: Normal rate and regular rhythm.   Pulmonary:      Effort: Pulmonary effort is normal.      Breath sounds: Normal breath sounds.   Abdominal:      General: Bowel sounds are normal.      Palpations: Abdomen is soft.   Musculoskeletal:      Cervical back: Neck supple.      Right lower leg: No edema.      Left lower leg: No edema.   Neurological:      Mental Status: She is alert.   Psychiatric:         Mood and Affect: Mood normal.         Behavior: Behavior is cooperative.         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    Provider Attestation - Scribe documentation  All medical record entries made by the Scribe were at my direction and personally dictated by me. I have reviewed the chart and agree that the record accurately reflects my personal performance of the history, physical exam, discussion and plan.    1. 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 Hypertension, essential        3. Chronic diastolic congestive heart failure (CMS/HCC)        4. FTT (failure to thrive) in adult

## 2023-10-25 NOTE — LETTER
Patient: Juani Sinclair  : 1934    Encounter Date: 10/25/2023    PROGRESS NOTE    Subjective  Chief complaint: Juani Sinclair is a 89 y.o. female who is a long term care patient being seen and evaluated for gen medical care and decline    HPI:  Patient has been having decline in condition her appetite has been poor and she has been having increased daytime sleepiness. She presented with SOB and increased edema. Family aware of her decline and refused hospitalization. They have decided to admit to hospice. Patient is presently comfortable and does not have any pain. Denies chest pain or headache.       Objective  Vital signs: 113/65, 98%    Physical Exam  Constitutional:       General: She is not in acute distress.  Eyes:      Extraocular Movements: Extraocular movements intact.   Cardiovascular:      Rate and Rhythm: Normal rate and regular rhythm.   Pulmonary:      Effort: Pulmonary effort is normal.      Breath sounds: Normal breath sounds.   Abdominal:      General: Bowel sounds are normal.      Palpations: Abdomen is soft.   Musculoskeletal:      Cervical back: Neck supple.      Right lower leg: No edema.      Left lower leg: No edema.   Neurological:      Mental Status: She is alert.   Psychiatric:         Mood and Affect: Mood normal.         Behavior: Behavior is cooperative.         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    Provider Attestation - Scribe documentation  All medical record entries made by the Scribe were at my direction and personally dictated by me. I have reviewed the chart and agree that the record accurately reflects my personal performance of the history, physical exam, discussion and plan.    1. 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 Hypertension, essential        3. Chronic diastolic congestive heart failure (CMS/HCC)        4. FTT (failure to thrive) in adult               Electronically Signed By: Hong Hope MD   10/25/23  6:06 PM

## 2023-10-27 NOTE — LETTER
Patient: Juani Sinclair  : 1934    Encounter Date: 10/27/2023    PROGRESS NOTE    Subjective  Chief complaint: Juani Sinclair is a 89 y.o. female who is a long term care patient being seen and evaluated for   Weakness, CHF    HPI:  patient with dementia, mentation at baseline.  Patient's Ativan was recently decreased due to complaints of daytime sleepiness.  According to nursing staff patient has been more awake and alert during the day.   Denies chest pain or headache.  Denies shortness of breath orthopnea.  Patient is comfortable presently without complaint.   Patient continues to work in therapy and is totally dependent for all transfers.  No acute distress.      Objective  Vital signs: , 122/72, 98%    Physical Exam  Constitutional:       General: She is not in acute distress.  Eyes:      Extraocular Movements: Extraocular movements intact.   Cardiovascular:      Rate and Rhythm: Normal rate and regular rhythm.   Pulmonary:      Effort: Pulmonary effort is normal.      Breath sounds: Normal breath sounds.   Abdominal:      General: Bowel sounds are normal.      Palpations: Abdomen is soft.   Musculoskeletal:      Cervical back: Neck supple.      Right lower leg: No edema.      Left lower leg: No edema.   Neurological:      Mental Status: She is alert.   Psychiatric:         Mood and Affect: Mood normal.         Behavior: Behavior is cooperative.         Assessment/Plan  Problem List Items Addressed This Visit       Hypertension, essential     BP at goal  Continue antihypertensives  Monitor BP         Chronic diastolic congestive heart failure (CMS/HCC)      increased Lasix  Monitor weight  GARFIELD diet  Monitor lab  Tubigrip's for edema         Severe late onset Alzheimer's dementia with anxiety (CMS/HCC) - Primary     Ativan PRN for anxiety  Secure unit for safety  Monitor behaviors         Daytime sleepiness     Improved with recent decrease of Ativan  continue Ativan 0.5 mg daily as needed for 14  days          Medications, treatments, and labs reviewed  Continue medications and treatments as listed in [facility]    Scribe Attestation  By signing my name below, I, Mihir Moreno   attest that this documentation has been prepared under the direction and in the presence of Hong Hope MD.    Provider Attestation - Scribe documentation  All medical record entries made by the Scribe were at my direction and personally dictated by me. I have reviewed the chart and agree that the record accurately reflects my personal performance of the history, physical exam, discussion and plan.    1. 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 Hypertension, essential        3. Daytime sleepiness        4. Chronic diastolic congestive heart failure (CMS/HCC)               Electronically Signed By: Hong Hope MD   10/27/23  5:27 PM

## 2023-10-27 NOTE — PROGRESS NOTES
PROGRESS NOTE    Subjective   Chief complaint: Juani Sinclair is a 89 y.o. female who is a long term care patient being seen and evaluated for   Weakness, CHF    HPI:  patient with dementia, mentation at baseline.  Patient's Ativan was recently decreased due to complaints of daytime sleepiness.  According to nursing staff patient has been more awake and alert during the day.   Denies chest pain or headache.  Denies shortness of breath orthopnea.  Patient is comfortable presently without complaint.   Patient continues to work in therapy and is totally dependent for all transfers.  No acute distress.      Objective   Vital signs: , 122/72, 98%    Physical Exam  Constitutional:       General: She is not in acute distress.  Eyes:      Extraocular Movements: Extraocular movements intact.   Cardiovascular:      Rate and Rhythm: Normal rate and regular rhythm.   Pulmonary:      Effort: Pulmonary effort is normal.      Breath sounds: Normal breath sounds.   Abdominal:      General: Bowel sounds are normal.      Palpations: Abdomen is soft.   Musculoskeletal:      Cervical back: Neck supple.      Right lower leg: No edema.      Left lower leg: No edema.   Neurological:      Mental Status: She is alert.   Psychiatric:         Mood and Affect: Mood normal.         Behavior: Behavior is cooperative.         Assessment/Plan   Problem List Items Addressed This Visit       Hypertension, essential     BP at goal  Continue antihypertensives  Monitor BP         Chronic diastolic congestive heart failure (CMS/HCC)      increased Lasix  Monitor weight  GARFIELD diet  Monitor lab  Tubigrip's for edema         Severe late onset Alzheimer's dementia with anxiety (CMS/HCC) - Primary     Ativan PRN for anxiety  Secure unit for safety  Monitor behaviors         Daytime sleepiness     Improved with recent decrease of Ativan  continue Ativan 0.5 mg daily as needed for 14 days          Medications, treatments, and labs reviewed  Continue  medications and treatments as listed in [facility]    Scribe Attestation  By signing my name below, I, Mihir Moreno   attest that this documentation has been prepared under the direction and in the presence of Hong Hope MD.    Provider Attestation - Scribe documentation  All medical record entries made by the Scribe were at my direction and personally dictated by me. I have reviewed the chart and agree that the record accurately reflects my personal performance of the history, physical exam, discussion and plan.    1. 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 Hypertension, essential        3. Daytime sleepiness        4. Chronic diastolic congestive heart failure (CMS/[facility])

## 2023-11-10 NOTE — LETTER
Patient: Juani Sinclair  : 1934    Encounter Date: 11/10/2023    PROGRESS NOTE    Subjective  Chief complaint: Juani Sinclair is a 89 y.o. female who is a long term care patient being seen and evaluated for monthly general medical care and follow-up.    HPI:  HPI  Patient presents for general medical care and f/u.  Patient seen and examined at bedside.  No issues per nursing.  Patient is on hospice, with comfort meds.  Patient has no acute complaints.  Patient with a diagnosis of depression, denies feeling down and thoughts of harming self or others.  Patient has a diagnosis of dementia and requires assistance with ADLs.  Mentation at baseline, alert x1.  Patient with history of hypertension, denies chest pain, headaches, blurred vision.  Patient with a diagnosis of CHF, denies shortness of breath, excessive fatigue.     Objective  Vital signs: 110/75, 97.2, 94, 18    Physical Exam  Constitutional:       General: She is not in acute distress.  Eyes:      Extraocular Movements: Extraocular movements intact.   Cardiovascular:      Rate and Rhythm: Normal rate and regular rhythm.   Pulmonary:      Effort: Pulmonary effort is normal.      Breath sounds: Normal breath sounds.   Abdominal:      General: Bowel sounds are normal.      Palpations: Abdomen is soft.   Musculoskeletal:      Cervical back: Neck supple.      Right lower leg: No edema.      Left lower leg: No edema.   Neurological:      Mental Status: She is alert.   Psychiatric:         Mood and Affect: Mood normal.         Behavior: Behavior is cooperative.         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 I have reviewed the chart and agree that the record accurately reflects my personal performance of the history, physical exam, discussion and plan.   Hong Hope MD            Electronically Signed By: Hong Hope MD   11/11/23  2:30 PM

## 2023-11-10 NOTE — PROGRESS NOTES
PROGRESS NOTE    Subjective   Chief complaint: Juani Sinclair is a 89 y.o. female who is a long term care patient being seen and evaluated for monthly general medical care and follow-up.    HPI:  HPI  Patient presents for general medical care and f/u.  Patient seen and examined at bedside.  No issues per nursing.  Patient is on hospice, with comfort meds.  Patient has no acute complaints.  Patient with a diagnosis of depression, denies feeling down and thoughts of harming self or others.  Patient has a diagnosis of dementia and requires assistance with ADLs.  Mentation at baseline, alert x1.  Patient with history of hypertension, denies chest pain, headaches, blurred vision.  Patient with a diagnosis of CHF, denies shortness of breath, excessive fatigue.     Objective   Vital signs: 110/75, 97.2, 94, 18    Physical Exam  Constitutional:       General: She is not in acute distress.  Eyes:      Extraocular Movements: Extraocular movements intact.   Cardiovascular:      Rate and Rhythm: Normal rate and regular rhythm.   Pulmonary:      Effort: Pulmonary effort is normal.      Breath sounds: Normal breath sounds.   Abdominal:      General: Bowel sounds are normal.      Palpations: Abdomen is soft.   Musculoskeletal:      Cervical back: Neck supple.      Right lower leg: No edema.      Left lower leg: No edema.   Neurological:      Mental Status: She is alert.   Psychiatric:         Mood and Affect: Mood normal.         Behavior: Behavior is cooperative.         Assessment/Plan   Problem List Items Addressed This Visit          Cardiac and Vasculature    Hypertension, essential     BP at goal  Continue antihypertensives  Monitor BP         Chronic diastolic congestive heart failure (CMS/HCC)     Lasix  Monitor weight  GARFIELD diet  Tubigrip's for edema            Mental Health    Depression with anxiety     Mood stable  Monitor for changes in mood and behavior  Ativan                 Neuro    Severe late onset Alzheimer's  dementia with anxiety (CMS/[facility]) - Primary     Ativan PRN for anxiety  Secure unit for safety  Monitor behaviors          Medications, treatments, and labs reviewed  Continue medications and treatments as listed in EMR    Scribe Attestation  I, Mihir Raymond   attest that this documentation has been prepared under the direction and in the presence of Hong Hope MD    Provider Attestation - Scribe documentation  All medical record entries made by the Scribe were at my direction and personally dictated by me. I have reviewed the chart and agree that the record accurately reflects my personal performance of the history, physical exam, discussion and plan.   Hong Hope MD

## 2023-11-28 NOTE — LETTER
Patient: Juani Sinclair  : 1934    Encounter Date: 2023    PROGRESS NOTE    Subjective  Chief complaint: Juani Sinclair is a 89 y.o. female who is a long term care patient being seen and evaluated for edema    HPI:  Nurse reports patient with increased leg edema. Patient is on lasix 40mg and KCL 10 meq. Patient seen and examined at bedside and in no acute distress at this time.       Objective  Vital signs: 134/87, 94%    Physical Exam  Constitutional:       General: She is not in acute distress.  Eyes:      Extraocular Movements: Extraocular movements intact.   Cardiovascular:      Rate and Rhythm: Normal rate and regular rhythm.   Pulmonary:      Effort: Pulmonary effort is normal.      Comments: +crackles  Musculoskeletal:      Cervical back: Neck supple.      Right lower leg: Edema present.      Left lower leg: Edema present.      Comments: pitting   Neurological:      Mental Status: She is alert.   Psychiatric:         Mood and Affect: Mood normal.         Behavior: Behavior is cooperative.         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    Provider Attestation - Scribe documentation  All medical record entries made by the Scribe were at my direction and personally dictated by me. I have reviewed  the chart and agree that the record accurately reflects my personal performance of the history, physical exam, discussion and plan.    1. 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 Hypertensive heart disease with chronic diastolic congestive heart failure (CMS/HCC)               Electronically Signed By: NIKKO Anderson   12/3/23  8:37 PM

## 2023-11-29 PROBLEM — R60.9 EDEMA: Status: ACTIVE | Noted: 2023-01-01

## 2023-11-29 NOTE — LETTER
Patient: Juani Sinclair  : 1934    Encounter Date: 2023    PROGRESS NOTE    Subjective  Chief complaint: Juani Sinclair is a 89 y.o. female who is a long term care patient being seen and evaluated for edema.    HPI:  HPI  Patient presents due to reports of patient having pitting edema to bilateral lower extremities. Orders were placed to double Lasix and potassium for 5 days on .  Patient was seen and examined at bedside, in no acute distress.  Denies chest pain or shortness of breath.  Denies nausea or vomiting.  Mentation is at baseline    Objective  Vital signs: 134/87, 97.8, 56, 19, 94%    Physical Exam  Constitutional:       General: She is not in acute distress.  Eyes:      Extraocular Movements: Extraocular movements intact.   Cardiovascular:      Rate and Rhythm: Normal rate and regular rhythm.   Pulmonary:      Effort: Pulmonary effort is normal.      Breath sounds: Normal breath sounds.   Abdominal:      General: Bowel sounds are normal.      Palpations: Abdomen is soft.   Musculoskeletal:      Cervical back: Neck supple.      Right lower leg: No edema.      Left lower leg: No edema.   Neurological:      Mental Status: She is alert.   Psychiatric:         Mood and Affect: Mood normal.         Behavior: Behavior is cooperative.         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 I have reviewed the chart and agree that the record accurately reflects my personal performance of the history, physical exam, discussion and plan.   Hong Hope MD            Electronically Signed By: Hong Hope MD   11/29/23  6:56 PM

## 2023-11-29 NOTE — PROGRESS NOTES
PROGRESS NOTE    Subjective   Chief complaint: Juani Sinclair is a 89 y.o. female who is a long term care patient being seen and evaluated for edema.    HPI:  HPI  Patient presents due to reports of patient having pitting edema to bilateral lower extremities. Orders were placed to double Lasix and potassium for 5 days on 11\28.  Patient was seen and examined at bedside, in no acute distress.  Denies chest pain or shortness of breath.  Denies nausea or vomiting.  Mentation is at baseline    Objective   Vital signs: 134/87, 97.8, 56, 19, 94%    Physical Exam  Constitutional:       General: She is not in acute distress.  Eyes:      Extraocular Movements: Extraocular movements intact.   Cardiovascular:      Rate and Rhythm: Normal rate and regular rhythm.   Pulmonary:      Effort: Pulmonary effort is normal.      Breath sounds: Normal breath sounds.   Abdominal:      General: Bowel sounds are normal.      Palpations: Abdomen is soft.   Musculoskeletal:      Cervical back: Neck supple.      Right lower leg: No edema.      Left lower leg: No edema.   Neurological:      Mental Status: She is alert.   Psychiatric:         Mood and Affect: Mood normal.         Behavior: Behavior is cooperative.         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 I have reviewed the chart and agree that the record accurately reflects my personal performance of the history, physical exam, discussion and plan.   Hong Hope MD

## 2023-11-30 NOTE — ASSESSMENT & PLAN NOTE
+ increased edema  Double lasix and KCL x5 days  Monitor weight and BP  Monitor labs  GARFIELD diet  Tubigrip's for edema  Continue antihypertensives

## 2023-11-30 NOTE — PROGRESS NOTES
PROGRESS NOTE    Subjective   Chief complaint: Juani Sinclair is a 89 y.o. female who is a long term care patient being seen and evaluated for edema    HPI:  Nurse reports patient with increased leg edema. Patient is on lasix 40mg and KCL 10 meq. Patient seen and examined at bedside and in no acute distress at this time.       Objective   Vital signs: 134/87, 94%    Physical Exam  Constitutional:       General: She is not in acute distress.  Eyes:      Extraocular Movements: Extraocular movements intact.   Cardiovascular:      Rate and Rhythm: Normal rate and regular rhythm.   Pulmonary:      Effort: Pulmonary effort is normal.      Comments: +crackles  Musculoskeletal:      Cervical back: Neck supple.      Right lower leg: Edema present.      Left lower leg: Edema present.      Comments: pitting   Neurological:      Mental Status: She is alert.   Psychiatric:         Mood and Affect: Mood normal.         Behavior: Behavior is cooperative.         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    Provider Attestation - Scribe documentation  All medical record entries made by the Scribe were at my direction and personally dictated by me. I have reviewed the chart and agree that the record accurately reflects my personal  performance of the history, physical exam, discussion and plan.    1. 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 Hypertensive heart disease with chronic diastolic congestive heart failure (CMS/HCC)

## 2023-11-30 NOTE — LETTER
Patient: Juani Sinclair  : 1934    Encounter Date: 2023    PROGRESS NOTE    Subjective  Chief complaint: Juani Sinclair is a 89 y.o. female who is a long term care patient being seen and evaluated for Coumadin management, Covid    HPI:  HPI Patient is on Coumadin and presents for anticoagulation management.  Nurse called and reported patient with INR 1.4, order was given to increase Coumadin to 4.5mg QD.  Patient denies bleeding and bruising. In addition Nurse reporting today that patient is positive for COVID 19. She is in no acute distress and remains afebrile.   Objective  Vital signs: 132/84, 98%    Physical Exam  Constitutional:       General: She is not in acute distress.  Cardiovascular:      Rate and Rhythm: Normal rate and regular rhythm.   Pulmonary:      Effort: Pulmonary effort is normal.      Breath sounds: Normal breath sounds.   Musculoskeletal:      Cervical back: Neck supple.      Right lower leg: Edema present.      Left lower leg: Edema present.   Neurological:      Mental Status: She is alert.   Psychiatric:         Behavior: Behavior is cooperative.         Assessment/Plan  Problem List Items Addressed This Visit       Hypertensive heart disease with chronic diastolic congestive heart failure (CMS/HCC) - Primary     Edema improving  lasix   Monitor weight  Monitor labs  GARFIELD diet  Tubigrip's for edema         COVID     Obtain CBC, BMP in am  Isolation  Supportive tx as needed         Subtherapeutic international normalized ratio (INR)     Coumadin increased  Continue to monitor INR          Medications, treatments, and labs reviewed  Continue medications and treatments as listed in PCC    Scribe Attestation  By signing my name below, IRocio Scribe   attest that this documentation has been prepared under the direction and in the presence of NIKKO Anderson.    Provider Attestation - Scribe documentation  All medical record entries made by the Scribe were at  my direction and personally dictated by me. I have reviewed the chart and agree that the record accurately reflects my personal performance of the history, physical exam, discussion and plan.           Electronically Signed By: NIKKO Anderson   7/28/24  8:00 PM

## 2023-12-01 NOTE — ASSESSMENT & PLAN NOTE
Edema improving  Double lasix and KCL x5 days  Monitor weight  Monitor labs  GARFIELD diet  Tubigrip's for edema

## 2023-12-01 NOTE — PROGRESS NOTES
PROGRESS NOTE    Subjective   Chief complaint: Juani Sinclair is a 89 y.o. female who is a long term care patient being seen and evaluated for edema    HPI:  HPI  Patient is seen in follow-up to bilateral pitting edema to bilateral lower extremities, patient has been on potassium and Lasix dose doubled for 5 days.  Patient's edema is improving.  Patient was seen and examined at bedside.  Patient is in no acute distress.  Patient has less edema in legs, 1-2+ pitting    Objective   Vital signs: 134/87, 97.8, 19, 94%    Physical Exam  Constitutional:       General: She is not in acute distress.  Eyes:      Extraocular Movements: Extraocular movements intact.   Cardiovascular:      Rate and Rhythm: Normal rate and regular rhythm.   Pulmonary:      Effort: Pulmonary effort is normal.      Breath sounds: Normal breath sounds.   Abdominal:      General: Bowel sounds are normal.      Palpations: Abdomen is soft.   Musculoskeletal:      Cervical back: Neck supple.      Right lower leg: Edema present.      Left lower leg: Edema present.      Comments: 1-2+ pitting edema bilateral legs   Neurological:      Mental Status: She is alert.   Psychiatric:         Mood and Affect: Mood normal.         Behavior: Behavior is cooperative.         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 I have reviewed the chart and agree that the record accurately reflects my personal performance of the history, physical exam, discussion and plan.   Hong Hope MD

## 2023-12-01 NOTE — LETTER
Patient: Juani Sinclair  : 1934    Encounter Date: 2023    PROGRESS NOTE    Subjective  Chief complaint: Juani Sinclair is a 89 y.o. female who is a long term care patient being seen and evaluated for edema    HPI:  HPI  Patient is seen in follow-up to bilateral pitting edema to bilateral lower extremities, patient has been on potassium and Lasix dose doubled for 5 days.  Patient's edema is improving.  Patient was seen and examined at bedside.  Patient is in no acute distress.  Patient has less edema in legs, 1-2+ pitting    Objective  Vital signs: 134/87, 97.8, 19, 94%    Physical Exam  Constitutional:       General: She is not in acute distress.  Eyes:      Extraocular Movements: Extraocular movements intact.   Cardiovascular:      Rate and Rhythm: Normal rate and regular rhythm.   Pulmonary:      Effort: Pulmonary effort is normal.      Breath sounds: Normal breath sounds.   Abdominal:      General: Bowel sounds are normal.      Palpations: Abdomen is soft.   Musculoskeletal:      Cervical back: Neck supple.      Right lower leg: Edema present.      Left lower leg: Edema present.      Comments: 1-2+ pitting edema bilateral legs   Neurological:      Mental Status: She is alert.   Psychiatric:         Mood and Affect: Mood normal.         Behavior: Behavior is cooperative.         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 I have reviewed the chart and agree that the record accurately reflects my personal performance of the history, physical exam, discussion and plan.   Hong Hope MD            Electronically Signed By: Hong Hope MD   12/2/23 12:00 PM

## 2023-12-03 PROBLEM — I11.0 HYPERTENSIVE HEART DISEASE WITH CHRONIC DIASTOLIC CONGESTIVE HEART FAILURE (MULTI): Status: ACTIVE | Noted: 2023-01-01

## 2023-12-03 PROBLEM — I10 HYPERTENSION, ESSENTIAL: Status: RESOLVED | Noted: 2023-01-01 | Resolved: 2023-01-01

## 2023-12-06 PROBLEM — T14.8XXA SCRATCHING: Status: RESOLVED | Noted: 2023-01-01 | Resolved: 2023-01-01

## 2023-12-06 PROBLEM — T14.8XXA SCRATCHING: Status: ACTIVE | Noted: 2023-01-01

## 2023-12-06 PROBLEM — L85.3 DRY SKIN: Status: ACTIVE | Noted: 2023-01-01

## 2023-12-06 NOTE — LETTER
Patient: Juani Sinclair  : 1934    Encounter Date: 2023    PROGRESS NOTE    Subjective  Chief complaint: uJani Sinclair is a 89 y.o. female who is a long term care patient being seen and evaluated for scratching    HPI:  HPI  Patient is seen due to nurse calling on 12\5, reporting patient was scratching upper thighs.  No redness, no signs of infection, self inflicting.  Patient seen and examined at bedside, no acute distress.  Denies chest pain or shortness of breath.  Denies nausea or vomiting.  Denies fever or chills.  Denies pain.  Mentation at baseline.  Objective  Vital signs: 136/71, 97.1, 62, 16, 97%    Physical Exam  Constitutional:       General: She is not in acute distress.  Eyes:      Extraocular Movements: Extraocular movements intact.   Cardiovascular:      Rate and Rhythm: Normal rate and regular rhythm.   Pulmonary:      Effort: Pulmonary effort is normal.      Breath sounds: Normal breath sounds.   Abdominal:      General: Bowel sounds are normal.      Palpations: Abdomen is soft.   Musculoskeletal:      Cervical back: Neck supple.      Right lower leg: Edema present.      Left lower leg: Edema present.      Comments: 1-2+ pitting edema bilateral legs   Skin:     Comments: No redness, no sign of infection   Neurological:      Mental Status: She is alert.   Psychiatric:         Mood and Affect: Mood normal.         Behavior: Behavior is cooperative.         Assessment/Plan  Problem List Items Addressed This Visit       Hypertensive heart disease with chronic diastolic congestive heart failure (CMS/HCC)     Edema improving  lasix   Monitor weight  Monitor labs  GARFIELD diet  Tubigrip's for edema         Severe late onset Alzheimer's dementia with anxiety (CMS/HCC) - Primary     Ativan PRN for anxiety  Secure unit for safety  Monitor behaviors         Dry skin     Apply house lotion for dryness  Monitor          Medications, treatments, and labs reviewed  Continue medications and  treatments as listed in EMR    Scribe Attestation  I, Mihir Raymond   attest that this documentation has been prepared under the direction and in the presence of Hong Hope MD    Provider Attestation - Scribe documentation  All medical record entries made by the Scribe were at my direction and personally dictated by me. I have reviewed the chart and agree that the record accurately reflects my personal performance of the history, physical exam, discussion and plan.   Hong Hope MD            Electronically Signed By: Hong Hope MD   12/6/23  5:01 PM

## 2023-12-06 NOTE — PROGRESS NOTES
PROGRESS NOTE    Subjective   Chief complaint: Juani Sinclair is a 89 y.o. female who is a long term care patient being seen and evaluated for scratching    HPI:  HPI  Patient is seen due to nurse calling on 12\5, reporting patient was scratching upper thighs.  No redness, no signs of infection, self inflicting.  Patient seen and examined at bedside, no acute distress.  Denies chest pain or shortness of breath.  Denies nausea or vomiting.  Denies fever or chills.  Denies pain.  Mentation at baseline.  Objective   Vital signs: 136/71, 97.1, 62, 16, 97%    Physical Exam  Constitutional:       General: She is not in acute distress.  Eyes:      Extraocular Movements: Extraocular movements intact.   Cardiovascular:      Rate and Rhythm: Normal rate and regular rhythm.   Pulmonary:      Effort: Pulmonary effort is normal.      Breath sounds: Normal breath sounds.   Abdominal:      General: Bowel sounds are normal.      Palpations: Abdomen is soft.   Musculoskeletal:      Cervical back: Neck supple.      Right lower leg: Edema present.      Left lower leg: Edema present.      Comments: 1-2+ pitting edema bilateral legs   Skin:     Comments: No redness, no sign of infection   Neurological:      Mental Status: She is alert.   Psychiatric:         Mood and Affect: Mood normal.         Behavior: Behavior is cooperative.         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 I have reviewed the chart and agree that the record accurately reflects my personal performance of the history, physical exam, discussion and plan.   Hong Hope MD           Declined

## 2023-12-08 NOTE — LETTER
Patient: Juani Sinclair  : 1934    Encounter Date: 2023    PROGRESS NOTE    Subjective  Chief complaint: Juani Sinclair is a 89 y.o. female who is a long term care patient being seen and evaluated for monthly general medical care and follow-up.    HPI:  HPI  Patient presents for general medical care and f/u.  Patient seen and examined at bedside.  No issues per nursing.  Patient has no acute complaints.  Patient has dementia and requires assist with ADLs.  HTN BP at goal.  Denies chest pain and headache.  Patient with diagnosis of depression.  Mood is stable.  Denies feeling down and thoughts of harming self or others.  Patient denies being nervous, anxious or scared.  Mentation at baseline, no acute distress    Objective  Vital signs: 136/71, 97.1, 62, 16, 97%    Physical Exam  Constitutional:       General: She is not in acute distress.  Eyes:      Extraocular Movements: Extraocular movements intact.   Cardiovascular:      Rate and Rhythm: Normal rate and regular rhythm.   Pulmonary:      Effort: Pulmonary effort is normal.      Breath sounds: Normal breath sounds.   Abdominal:      General: Bowel sounds are normal.      Palpations: Abdomen is soft.   Musculoskeletal:      Cervical back: Neck supple.      Right lower leg: Edema present.      Left lower leg: Edema present.   Neurological:      Mental Status: She is alert.   Psychiatric:         Mood and Affect: Mood normal.         Behavior: Behavior is cooperative.         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 I have reviewed the chart and agree that the record accurately reflects my personal performance of the history, physical exam, discussion and plan.   Hong Hope MD            Electronically Signed By: Hong Hope MD   12/9/23  1:11 PM

## 2023-12-08 NOTE — PROGRESS NOTES
PROGRESS NOTE    Subjective   Chief complaint: Juani Sinclair is a 89 y.o. female who is a long term care patient being seen and evaluated for monthly general medical care and follow-up.    HPI:  HPI  Patient presents for general medical care and f/u.  Patient seen and examined at bedside.  No issues per nursing.  Patient has no acute complaints.  Patient has dementia and requires assist with ADLs.  HTN BP at goal.  Denies chest pain and headache.  Patient with diagnosis of depression.  Mood is stable.  Denies feeling down and thoughts of harming self or others.  Patient denies being nervous, anxious or scared.  Mentation at baseline, no acute distress    Objective   Vital signs: 136/71, 97.1, 62, 16, 97%    Physical Exam  Constitutional:       General: She is not in acute distress.  Eyes:      Extraocular Movements: Extraocular movements intact.   Cardiovascular:      Rate and Rhythm: Normal rate and regular rhythm.   Pulmonary:      Effort: Pulmonary effort is normal.      Breath sounds: Normal breath sounds.   Abdominal:      General: Bowel sounds are normal.      Palpations: Abdomen is soft.   Musculoskeletal:      Cervical back: Neck supple.      Right lower leg: Edema present.      Left lower leg: Edema present.   Neurological:      Mental Status: She is alert.   Psychiatric:         Mood and Affect: Mood normal.         Behavior: Behavior is cooperative.         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 I have reviewed the chart and agree that the record accurately reflects my personal performance of the history, physical exam, discussion and plan.   Hong Hope MD

## 2023-12-17 PROBLEM — R79.1 SUBTHERAPEUTIC INTERNATIONAL NORMALIZED RATIO (INR): Status: ACTIVE | Noted: 2023-12-17

## 2023-12-17 NOTE — PROGRESS NOTES
PROGRESS NOTE    Subjective   Chief complaint: Juani Sinclair is a 89 y.o. female who is a long term care patient being seen and evaluated for Coumadin management, Covid    HPI:  HPI Patient is on Coumadin and presents for anticoagulation management.  Nurse called and reported patient with INR 1.4, order was given to increase Coumadin to 4.5mg QD.  Patient denies bleeding and bruising. In addition Nurse reporting today that patient is positive for COVID 19. She is in no acute distress and remains afebrile.   Objective   Vital signs: 132/84, 98%    Physical Exam  Constitutional:       General: She is not in acute distress.  Cardiovascular:      Rate and Rhythm: Normal rate and regular rhythm.   Pulmonary:      Effort: Pulmonary effort is normal.      Breath sounds: Normal breath sounds.   Musculoskeletal:      Cervical back: Neck supple.      Right lower leg: Edema present.      Left lower leg: Edema present.   Neurological:      Mental Status: She is alert.   Psychiatric:         Behavior: Behavior is cooperative.         Assessment/Plan   Problem List Items Addressed This Visit       Hypertensive heart disease with chronic diastolic congestive heart failure (CMS/HCC) - Primary     Edema improving  lasix   Monitor weight  Monitor labs  GARFIELD diet  Tubigrip's for edema         COVID     Obtain CBC, BMP in am  Isolation  Supportive tx as needed         Subtherapeutic international normalized ratio (INR)     Coumadin increased  Continue to monitor INR          Medications, treatments, and labs reviewed  Continue medications and treatments as listed in [facility]    Scribe Attestation  By signing my name below, Rocio SMALLS Scribe   attest that this documentation has been prepared under the direction and in the presence of NIKKO Anderson.    Provider Attestation - Scribe documentation  All medical record entries made by the Scribe were at my direction and personally dictated by me. I have reviewed the chart  and agree that the record accurately reflects my personal performance of the history, physical exam, discussion and plan.

## 2025-02-13 NOTE — PROGRESS NOTES
PROGRESS NOTE    Subjective   Chief complaint: Juani Sinclair is a 88 y.o. female who is a long term care patient being seen and evaluated for weight gain and poor appetite    HPI:  Patient had weight gain and also has poor appetite. Lasix was increased to mg daily. Labs were obtained and pending. No other concerns at this time. No acute distress.       Objective   Vital signs: 123/76, 97%    Physical Exam  Constitutional:       General: She is not in acute distress.  Eyes:      Extraocular Movements: Extraocular movements intact.   Cardiovascular:      Rate and Rhythm: Normal rate and regular rhythm.   Pulmonary:      Effort: Pulmonary effort is normal.      Breath sounds: Normal breath sounds.   Abdominal:      General: Bowel sounds are normal.      Palpations: Abdomen is soft.   Musculoskeletal:      Cervical back: Neck supple.      Right lower leg: Edema present.      Left lower leg: Edema present.   Skin:     Comments: No redness or discoloration noted   Neurological:      Mental Status: She is alert.   Psychiatric:         Mood and Affect: Mood normal.         Behavior: Behavior is cooperative.         Assessment/Plan   Problem List Items Addressed This Visit    None    Medications, treatments, and labs reviewed  Continue medications and treatments as listed in [facility]    Scribe Attestation  By signing my name below, IRocio Scribe   attest that this documentation has been prepared under the direction and in the presence of Hong Hope MD.    Provider Attestation - Scribe documentation  All medical record entries made by the Scribe were at my direction and personally dictated by me. I have reviewed the chart and agree that the record accurately reflects my personal performance of the history, physical exam, discussion and plan.    No diagnosis found.    dietitian/nutrition services